# Patient Record
Sex: MALE | Race: WHITE | NOT HISPANIC OR LATINO | Employment: OTHER | URBAN - METROPOLITAN AREA
[De-identification: names, ages, dates, MRNs, and addresses within clinical notes are randomized per-mention and may not be internally consistent; named-entity substitution may affect disease eponyms.]

---

## 2023-02-24 PROBLEM — E78.5 DYSLIPIDEMIA: Status: ACTIVE | Noted: 2023-02-24

## 2023-02-24 PROBLEM — I10 HYPERTENSION: Status: ACTIVE | Noted: 2023-02-24

## 2023-02-24 PROBLEM — K22.70 BARRETT ESOPHAGUS: Status: ACTIVE | Noted: 2023-02-24

## 2023-02-24 PROBLEM — Z99.89 CPAP (CONTINUOUS POSITIVE AIRWAY PRESSURE) DEPENDENCE: Status: ACTIVE | Noted: 2023-02-24

## 2023-02-24 PROBLEM — G47.33 OBSTRUCTIVE SLEEP APNEA: Status: ACTIVE | Noted: 2023-02-24

## 2023-02-24 RX ORDER — LOSARTAN POTASSIUM AND HYDROCHLOROTHIAZIDE 12.5; 5 MG/1; MG/1
1 TABLET ORAL DAILY
COMMUNITY
Start: 2021-12-21 | End: 2024-01-16

## 2023-02-24 RX ORDER — PSYLLIUM HUSK 0.4 G
1 CAPSULE ORAL
COMMUNITY
Start: 2021-01-19

## 2023-02-24 RX ORDER — ESOMEPRAZOLE MAGNESIUM 40 MG/1
1 CAPSULE, DELAYED RELEASE ORAL DAILY
COMMUNITY
Start: 2012-11-21 | End: 2023-03-09 | Stop reason: SDUPTHER

## 2023-02-24 RX ORDER — ROSUVASTATIN CALCIUM 20 MG/1
1 TABLET, COATED ORAL DAILY
COMMUNITY
Start: 2022-01-21 | End: 2023-05-25 | Stop reason: ALTCHOICE

## 2023-03-09 DIAGNOSIS — K22.719 BARRETT'S ESOPHAGUS WITH DYSPLASIA: Primary | ICD-10-CM

## 2023-03-09 RX ORDER — ESOMEPRAZOLE MAGNESIUM 40 MG/1
40 CAPSULE, DELAYED RELEASE ORAL DAILY
Qty: 90 CAPSULE | Refills: 0 | Status: SHIPPED | OUTPATIENT
Start: 2023-03-09 | End: 2023-05-30

## 2023-03-09 RX ORDER — ROSUVASTATIN CALCIUM 10 MG/1
10 TABLET, COATED ORAL NIGHTLY
COMMUNITY
Start: 2022-08-29 | End: 2023-05-25 | Stop reason: ALTCHOICE

## 2023-03-09 RX ORDER — ROSUVASTATIN CALCIUM 40 MG/1
40 TABLET, COATED ORAL DAILY
COMMUNITY
Start: 2023-01-20 | End: 2024-03-19

## 2023-03-22 ENCOUNTER — TELEPHONE (OUTPATIENT)
Dept: PRIMARY CARE | Facility: CLINIC | Age: 58
End: 2023-03-22
Payer: COMMERCIAL

## 2023-03-22 DIAGNOSIS — J45.20 MILD INTERMITTENT REACTIVE AIRWAY DISEASE WITHOUT COMPLICATION (HHS-HCC): Primary | ICD-10-CM

## 2023-03-22 RX ORDER — ALBUTEROL SULFATE 90 UG/1
2 AEROSOL, METERED RESPIRATORY (INHALATION) EVERY 6 HOURS PRN
COMMUNITY
End: 2023-03-23 | Stop reason: SDUPTHER

## 2023-03-24 DIAGNOSIS — J45.20 MILD INTERMITTENT REACTIVE AIRWAY DISEASE WITHOUT COMPLICATION (HHS-HCC): ICD-10-CM

## 2023-03-24 RX ORDER — ALBUTEROL SULFATE 90 UG/1
2 AEROSOL, METERED RESPIRATORY (INHALATION) EVERY 6 HOURS PRN
Qty: 18 G | Refills: 3 | Status: SHIPPED | OUTPATIENT
Start: 2023-03-24 | End: 2023-03-24 | Stop reason: SDUPTHER

## 2023-03-24 RX ORDER — ALBUTEROL SULFATE 90 UG/1
2 AEROSOL, METERED RESPIRATORY (INHALATION) EVERY 6 HOURS PRN
Qty: 18 G | Refills: 3 | Status: SHIPPED | OUTPATIENT
Start: 2023-03-24

## 2023-04-13 LAB
ALANINE AMINOTRANSFERASE (SGPT) (U/L) IN SER/PLAS: 40 U/L (ref 10–52)
ALBUMIN (G/DL) IN SER/PLAS: 4.1 G/DL (ref 3.4–5)
ALKALINE PHOSPHATASE (U/L) IN SER/PLAS: 87 U/L (ref 33–120)
ASPARTATE AMINOTRANSFERASE (SGOT) (U/L) IN SER/PLAS: 23 U/L (ref 9–39)
BILIRUBIN DIRECT (MG/DL) IN SER/PLAS: 0.1 MG/DL (ref 0–0.3)
BILIRUBIN TOTAL (MG/DL) IN SER/PLAS: 0.8 MG/DL (ref 0–1.2)
CHOLESTEROL (MG/DL) IN SER/PLAS: 166 MG/DL (ref 0–199)
CHOLESTEROL IN HDL (MG/DL) IN SER/PLAS: 47.2 MG/DL
CHOLESTEROL/HDL RATIO: 3.5
ESTIMATED AVERAGE GLUCOSE FOR HBA1C: 123 MG/DL
HEMOGLOBIN A1C/HEMOGLOBIN TOTAL IN BLOOD: 5.9 %
LDL: 102 MG/DL (ref 0–99)
PROTEIN TOTAL: 7.2 G/DL (ref 6.4–8.2)
TRIGLYCERIDE (MG/DL) IN SER/PLAS: 83 MG/DL (ref 0–149)
VLDL: 17 MG/DL (ref 0–40)

## 2023-04-17 ENCOUNTER — APPOINTMENT (OUTPATIENT)
Dept: PRIMARY CARE | Facility: CLINIC | Age: 58
End: 2023-04-17
Payer: COMMERCIAL

## 2023-05-22 PROBLEM — K64.9 HEMORRHOID: Status: ACTIVE | Noted: 2023-05-22

## 2023-05-22 PROBLEM — E66.9 OBESITY (BMI 30-39.9): Status: RESOLVED | Noted: 2023-05-22 | Resolved: 2023-05-22

## 2023-05-22 PROBLEM — R03.0 ELEVATED BLOOD PRESSURE READING: Status: ACTIVE | Noted: 2023-05-22

## 2023-05-22 PROBLEM — R09.81 NASAL CONGESTION: Status: RESOLVED | Noted: 2023-05-22 | Resolved: 2023-05-22

## 2023-05-22 PROBLEM — R73.9 ELEVATED BLOOD SUGAR: Status: ACTIVE | Noted: 2023-05-22

## 2023-05-22 PROBLEM — B00.1 HERPES LABIALIS: Status: ACTIVE | Noted: 2023-05-22

## 2023-05-22 RX ORDER — EPINEPHRINE 0.1 MG/ML
INJECTION INTRAVENOUS
COMMUNITY

## 2023-05-22 RX ORDER — SODIUM, POTASSIUM,MAG SULFATES 17.5-3.13G
SOLUTION, RECONSTITUTED, ORAL ORAL
COMMUNITY
Start: 2023-04-18

## 2023-05-25 ENCOUNTER — OFFICE VISIT (OUTPATIENT)
Dept: PRIMARY CARE | Facility: CLINIC | Age: 58
End: 2023-05-25
Payer: COMMERCIAL

## 2023-05-25 ENCOUNTER — TELEPHONE (OUTPATIENT)
Dept: PRIMARY CARE | Facility: CLINIC | Age: 58
End: 2023-05-25

## 2023-05-25 VITALS
WEIGHT: 257 LBS | OXYGEN SATURATION: 97 % | SYSTOLIC BLOOD PRESSURE: 150 MMHG | BODY MASS INDEX: 34.81 KG/M2 | TEMPERATURE: 97.4 F | HEART RATE: 67 BPM | DIASTOLIC BLOOD PRESSURE: 80 MMHG | HEIGHT: 72 IN

## 2023-05-25 DIAGNOSIS — I86.1 VARICOCELE: ICD-10-CM

## 2023-05-25 DIAGNOSIS — E78.5 DYSLIPIDEMIA: ICD-10-CM

## 2023-05-25 DIAGNOSIS — N50.819 TESTICULAR DISCOMFORT: ICD-10-CM

## 2023-05-25 DIAGNOSIS — I10 BENIGN ESSENTIAL HYPERTENSION: Primary | ICD-10-CM

## 2023-05-25 DIAGNOSIS — I10 PRIMARY HYPERTENSION: ICD-10-CM

## 2023-05-25 DIAGNOSIS — R73.03 PRE-DIABETES: ICD-10-CM

## 2023-05-25 PROBLEM — M25.642 STIFFNESS OF FINGER JOINT OF LEFT HAND: Status: ACTIVE | Noted: 2019-01-28

## 2023-05-25 PROBLEM — G56.00 CARPAL TUNNEL SYNDROME: Status: ACTIVE | Noted: 2018-10-31

## 2023-05-25 PROBLEM — K64.9 HEMORRHOID: Status: RESOLVED | Noted: 2023-05-22 | Resolved: 2023-05-25

## 2023-05-25 PROBLEM — R03.0 ELEVATED BLOOD PRESSURE READING: Status: RESOLVED | Noted: 2023-05-22 | Resolved: 2023-05-25

## 2023-05-25 PROBLEM — G56.00 CARPAL TUNNEL SYNDROME: Status: RESOLVED | Noted: 2018-10-31 | Resolved: 2023-05-25

## 2023-05-25 PROBLEM — J45.909 ASTHMA WITHOUT STATUS ASTHMATICUS (HHS-HCC): Status: ACTIVE | Noted: 2023-05-25

## 2023-05-25 PROBLEM — G56.03 BILATERAL CARPAL TUNNEL SYNDROME: Status: ACTIVE | Noted: 2018-10-29

## 2023-05-25 PROBLEM — R73.9 ELEVATED BLOOD SUGAR: Status: RESOLVED | Noted: 2023-05-22 | Resolved: 2023-05-25

## 2023-05-25 PROCEDURE — 99214 OFFICE O/P EST MOD 30 MIN: CPT | Performed by: FAMILY MEDICINE

## 2023-05-25 PROCEDURE — 3077F SYST BP >= 140 MM HG: CPT | Performed by: FAMILY MEDICINE

## 2023-05-25 PROCEDURE — 1036F TOBACCO NON-USER: CPT | Performed by: FAMILY MEDICINE

## 2023-05-25 PROCEDURE — 3079F DIAST BP 80-89 MM HG: CPT | Performed by: FAMILY MEDICINE

## 2023-05-25 RX ORDER — METOPROLOL SUCCINATE 50 MG/1
TABLET, EXTENDED RELEASE ORAL
COMMUNITY
Start: 2010-06-22

## 2023-05-25 RX ORDER — AMLODIPINE BESYLATE 5 MG/1
5 TABLET ORAL
COMMUNITY
Start: 2018-06-27 | End: 2023-05-25 | Stop reason: ALTCHOICE

## 2023-05-25 RX ORDER — DOCUSATE SODIUM 100 MG/1
1 CAPSULE, LIQUID FILLED ORAL 2 TIMES DAILY PRN
COMMUNITY
Start: 2019-01-15 | End: 2023-05-25 | Stop reason: ALTCHOICE

## 2023-05-25 RX ORDER — AMLODIPINE BESYLATE 10 MG/1
TABLET ORAL
COMMUNITY
Start: 2017-03-07 | End: 2023-05-25 | Stop reason: ALTCHOICE

## 2023-05-25 RX ORDER — VALACYCLOVIR HYDROCHLORIDE 500 MG/1
500 TABLET, FILM COATED ORAL
COMMUNITY
Start: 2017-03-06 | End: 2024-03-12 | Stop reason: ALTCHOICE

## 2023-05-25 RX ORDER — SALICYLIC ACID 260 MG/ML
LIQUID TOPICAL
COMMUNITY
Start: 2010-06-22 | End: 2023-05-25 | Stop reason: ALTCHOICE

## 2023-05-25 ASSESSMENT — ENCOUNTER SYMPTOMS
ABDOMINAL PAIN: 0
DIZZINESS: 0
FATIGUE: 0
HALLUCINATIONS: 0
DYSURIA: 0
BLOOD IN STOOL: 0
JOINT SWELLING: 0
TROUBLE SWALLOWING: 0
SHORTNESS OF BREATH: 0
NAUSEA: 0
CONFUSION: 0
UNEXPECTED WEIGHT CHANGE: 0
HEADACHES: 0
PALPITATIONS: 0
DECREASED CONCENTRATION: 0
WEAKNESS: 0
FREQUENCY: 0
EYE PAIN: 0
SORE THROAT: 0
HEMATURIA: 0
NUMBNESS: 0
VOMITING: 0
FEVER: 0
DIARRHEA: 0
COUGH: 0

## 2023-05-25 ASSESSMENT — PATIENT HEALTH QUESTIONNAIRE - PHQ9
1. LITTLE INTEREST OR PLEASURE IN DOING THINGS: NOT AT ALL
2. FEELING DOWN, DEPRESSED OR HOPELESS: NOT AT ALL
SUM OF ALL RESPONSES TO PHQ9 QUESTIONS 1 AND 2: 0

## 2023-05-25 ASSESSMENT — PAIN SCALES - GENERAL: PAINLEVEL: 0-NO PAIN

## 2023-05-25 NOTE — TELEPHONE ENCOUNTER
Pharmacist questioning ozempic written at .25mg for 14 days, then .5mg for 14 days, then 1mg there after. States typically written for a month, then increased. If wanting patient taking new dose every 14 days, requesting 3 separate scripts be sent to pharmacy with individual instructions.

## 2023-05-25 NOTE — PATIENT INSTRUCTIONS
It was nice to see you today!  Discussed current concerns and addressed   Reviewed recent labs and diagnostics  Reviewed medications list  Continue to eat a healthy diet, exercise at least 3 times a week or more  Plan and follow up discussed  For any further information related to your condition, copy and paste or go to [email or web address]  Start ozempic for sugar  Check ultrasound of L scrotum

## 2023-05-25 NOTE — PROGRESS NOTES
Subjective   Patient ID: Woodrow Rodriguez is a 58 y.o. male.    Patient comes in today for follow-up.  He is overweight with a BMI of 34.  He had labs done last month.  Glycohemoglobin 5.9%.  LDL is good at 102.  Hepatic function is normal.  He is very physically active but he loves to eat.  He reports eating 5 to 10,000 ulisses daily he also has an enlargement in the left scrotal sac.  It is somewhat painful.  There may have been a trauma decades ago.  No dysuria and no gross hematuria.  Blood pressure is elevated.  He is on medication for high blood pressure, high cholesterol.  We discussed starting semaglutide for his weight and his blood sugar.  He does not smoke.  He is a retired .        Review of Systems   Constitutional:  Negative for fatigue, fever and unexpected weight change.   HENT:  Negative for congestion, ear pain, hearing loss, sore throat and trouble swallowing.    Eyes:  Negative for pain and visual disturbance.   Respiratory:  Negative for cough and shortness of breath.    Cardiovascular:  Negative for chest pain, palpitations and leg swelling.   Gastrointestinal:  Negative for abdominal pain, blood in stool, diarrhea, nausea and vomiting.   Genitourinary:  Positive for scrotal swelling. Negative for dysuria, frequency, hematuria and urgency.   Musculoskeletal:  Negative for joint swelling.   Skin:  Negative for pallor and rash.   Neurological:  Negative for dizziness, syncope, weakness, numbness and headaches.   Psychiatric/Behavioral:  Negative for confusion, decreased concentration, hallucinations and suicidal ideas.      Vitals:    05/25/23 0741   BP: 150/80   Pulse: 67   Temp: 36.3 °C (97.4 °F)   SpO2: 97%      Objective   Physical Exam  Constitutional:       Appearance: Normal appearance.   Cardiovascular:      Rate and Rhythm: Normal rate and regular rhythm.      Heart sounds: Normal heart sounds.   Pulmonary:      Effort: Pulmonary effort is normal.      Breath sounds:  Normal breath sounds.   Genitourinary:     Comments: Left scrotal sac with palpable testis that is normal.  He does have soft contents of the sac above the testicle.  It is nontender, it is not a well circumscribed mass.  Musculoskeletal:      Cervical back: Neck supple.   Skin:     General: Skin is warm and dry.   Neurological:      General: No focal deficit present.      Mental Status: He is alert.   Psychiatric:         Mood and Affect: Mood normal.         Speech: Speech normal.         Behavior: Behavior normal.         Cognition and Memory: Cognition normal.         Assessment/Plan   Diagnoses and all orders for this visit:  Benign essential hypertension  Primary hypertension  Pre-diabetes  -     semaglutide 0.25 mg or 0.5 mg (2 mg/3 mL) pen injector; Inject 0.25 mg under the skin 1 (one) time per week for 14 days, THEN 0.5 mg 1 (one) time per week for 14 days, THEN 1 mg 1 (one) time per week.  Testicular discomfort  -     US scrotum; Future

## 2023-05-26 DIAGNOSIS — K22.719 BARRETT'S ESOPHAGUS WITH DYSPLASIA: ICD-10-CM

## 2023-05-30 RX ORDER — ESOMEPRAZOLE MAGNESIUM 40 MG/1
CAPSULE, DELAYED RELEASE ORAL
Qty: 90 CAPSULE | Refills: 3 | Status: SHIPPED | OUTPATIENT
Start: 2023-05-30

## 2023-05-30 RX ORDER — SEMAGLUTIDE 1.34 MG/ML
1 INJECTION, SOLUTION SUBCUTANEOUS
COMMUNITY
End: 2023-08-31 | Stop reason: ALTCHOICE

## 2023-06-09 ENCOUNTER — TELEPHONE (OUTPATIENT)
Dept: PRIMARY CARE | Facility: CLINIC | Age: 58
End: 2023-06-09
Payer: COMMERCIAL

## 2023-06-09 DIAGNOSIS — R73.03 PRE-DIABETES: Primary | ICD-10-CM

## 2023-06-09 RX ORDER — METFORMIN HYDROCHLORIDE 500 MG/1
1000 TABLET, EXTENDED RELEASE ORAL
Qty: 60 TABLET | Refills: 11 | Status: SHIPPED | OUTPATIENT
Start: 2023-06-09 | End: 2024-06-08

## 2023-06-09 NOTE — TELEPHONE ENCOUNTER
Ozempic not covered. Per MD, will send metfromin in place of Ozempic. Patient notified. He mentioned asking the pharmacist the cost of the Ozempic out of pocket as well.

## 2023-08-28 PROBLEM — I86.1 VARICOCELE: Status: ACTIVE | Noted: 2023-08-28

## 2023-08-28 PROBLEM — N50.819 PERSISTENT TESTICULAR PAIN: Status: ACTIVE | Noted: 2023-08-28

## 2023-08-31 ENCOUNTER — OFFICE VISIT (OUTPATIENT)
Dept: PRIMARY CARE | Facility: CLINIC | Age: 58
End: 2023-08-31
Payer: COMMERCIAL

## 2023-08-31 VITALS
SYSTOLIC BLOOD PRESSURE: 110 MMHG | WEIGHT: 255 LBS | BODY MASS INDEX: 34.54 KG/M2 | OXYGEN SATURATION: 98 % | DIASTOLIC BLOOD PRESSURE: 70 MMHG | HEIGHT: 72 IN | HEART RATE: 78 BPM | TEMPERATURE: 97.1 F

## 2023-08-31 DIAGNOSIS — G47.33 OBSTRUCTIVE SLEEP APNEA: ICD-10-CM

## 2023-08-31 DIAGNOSIS — R73.03 PRE-DIABETES: ICD-10-CM

## 2023-08-31 DIAGNOSIS — I10 BENIGN ESSENTIAL HYPERTENSION: Primary | ICD-10-CM

## 2023-08-31 DIAGNOSIS — Z99.89 CPAP (CONTINUOUS POSITIVE AIRWAY PRESSURE) DEPENDENCE: ICD-10-CM

## 2023-08-31 DIAGNOSIS — E78.5 DYSLIPIDEMIA: ICD-10-CM

## 2023-08-31 LAB — POC HEMOGLOBIN A1C: 5.9 % (ref 4.2–6.5)

## 2023-08-31 PROCEDURE — 3074F SYST BP LT 130 MM HG: CPT | Performed by: FAMILY MEDICINE

## 2023-08-31 PROCEDURE — 99213 OFFICE O/P EST LOW 20 MIN: CPT | Performed by: FAMILY MEDICINE

## 2023-08-31 PROCEDURE — 1036F TOBACCO NON-USER: CPT | Performed by: FAMILY MEDICINE

## 2023-08-31 PROCEDURE — 83036 HEMOGLOBIN GLYCOSYLATED A1C: CPT | Performed by: FAMILY MEDICINE

## 2023-08-31 PROCEDURE — 3078F DIAST BP <80 MM HG: CPT | Performed by: FAMILY MEDICINE

## 2023-08-31 RX ORDER — AMLODIPINE BESYLATE 5 MG/1
5 TABLET ORAL
COMMUNITY
Start: 2018-06-27

## 2023-08-31 ASSESSMENT — ENCOUNTER SYMPTOMS
FATIGUE: 0
NUMBNESS: 0
UNEXPECTED WEIGHT CHANGE: 0
SHORTNESS OF BREATH: 0
TROUBLE SWALLOWING: 0
DIZZINESS: 0
NAUSEA: 0
HEADACHES: 0
CONFUSION: 0
FEVER: 0
HEMATURIA: 0
VOMITING: 0
ABDOMINAL PAIN: 0
JOINT SWELLING: 0
DYSURIA: 0
EYE PAIN: 0
HALLUCINATIONS: 0
PALPITATIONS: 0
BLOOD IN STOOL: 0
DIARRHEA: 0
FREQUENCY: 0
COUGH: 0
WEAKNESS: 0
SORE THROAT: 0
DECREASED CONCENTRATION: 0

## 2023-08-31 ASSESSMENT — PAIN SCALES - GENERAL: PAINLEVEL: 0-NO PAIN

## 2023-08-31 NOTE — PROGRESS NOTES
Subjective   Patient ID: Woodrow Rodriguez is a 58 y.o. male.    Patient comes in today for follow-up and recheck on A1c.  He is prediabetic and I have put him on metformin.  He did not take it because he read the side effects.  His BMI is 34.58.  He is trying to work on diet and exercise but has not been successful.  He has no chest pain or shortness of breath.  He has sleep at [facility] on CPAP.  He has hypertension and dyslipidemia.  He does not smoke.        Review of Systems   Constitutional:  Negative for fatigue, fever and unexpected weight change.   HENT:  Negative for congestion, ear pain, hearing loss, sore throat and trouble swallowing.    Eyes:  Negative for pain and visual disturbance.   Respiratory:  Negative for cough and shortness of breath.    Cardiovascular:  Negative for chest pain, palpitations and leg swelling.   Gastrointestinal:  Negative for abdominal pain, blood in stool, diarrhea, nausea and vomiting.   Genitourinary:  Negative for dysuria, frequency, hematuria and urgency.   Musculoskeletal:  Negative for joint swelling.   Skin:  Negative for pallor and rash.   Neurological:  Negative for dizziness, syncope, weakness, numbness and headaches.   Psychiatric/Behavioral:  Negative for confusion, decreased concentration, hallucinations and suicidal ideas.      Vitals:    08/31/23 0749   BP: 110/70   Pulse: 78   Temp: 36.2 °C (97.1 °F)   SpO2: 98%      Objective   Physical Exam  Constitutional:       Appearance: Normal appearance. He is obese.   Cardiovascular:      Rate and Rhythm: Normal rate and regular rhythm.      Heart sounds: Normal heart sounds.   Pulmonary:      Effort: Pulmonary effort is normal.      Breath sounds: Normal breath sounds.   Musculoskeletal:      Cervical back: Neck supple.   Skin:     General: Skin is warm and dry.   Neurological:      General: No focal deficit present.      Mental Status: He is alert.   Psychiatric:         Mood and Affect: Mood normal.         Speech:  Speech normal.         Behavior: Behavior normal.         Cognition and Memory: Cognition normal.     Please take the metformin.  We will recheck in 3 months, work on cutting calories down    Assessment/Plan   Diagnoses and all orders for this visit:  Benign essential hypertension  Pre-diabetes  -     POCT glycosylated hemoglobin (Hb A1C) manually resulted  Dyslipidemia  Obstructive sleep apnea  CPAP (continuous positive airway pressure) dependence

## 2023-09-18 ENCOUNTER — HOSPITAL ENCOUNTER (OUTPATIENT)
Dept: DATA CONVERSION | Facility: HOSPITAL | Age: 58
End: 2023-09-18
Attending: INTERNAL MEDICINE | Admitting: INTERNAL MEDICINE
Payer: COMMERCIAL

## 2023-09-18 DIAGNOSIS — Z09 ENCOUNTER FOR FOLLOW-UP EXAMINATION AFTER COMPLETED TREATMENT FOR CONDITIONS OTHER THAN MALIGNANT NEOPLASM: ICD-10-CM

## 2023-09-18 DIAGNOSIS — K31.A11 GASTRIC INTESTINAL METAPLASIA WITHOUT DYSPLASIA, INVOLVING THE ANTRUM: ICD-10-CM

## 2023-09-18 DIAGNOSIS — K22.70 BARRETT'S ESOPHAGUS WITHOUT DYSPLASIA: ICD-10-CM

## 2023-09-18 DIAGNOSIS — Z86.010 PERSONAL HISTORY OF COLONIC POLYPS: ICD-10-CM

## 2023-09-18 DIAGNOSIS — Z12.11 ENCOUNTER FOR SCREENING FOR MALIGNANT NEOPLASM OF COLON: ICD-10-CM

## 2023-09-18 LAB — POCT GLUCOSE: 96 MG/DL (ref 74–99)

## 2023-09-18 RX ORDER — VALACYCLOVIR HYDROCHLORIDE 1 G/1
2 TABLET, FILM COATED ORAL
COMMUNITY
End: 2024-03-12 | Stop reason: SDUPTHER

## 2023-09-18 RX ORDER — FLUTICASONE PROPIONATE 50 MCG
2 SPRAY, SUSPENSION (ML) NASAL DAILY
COMMUNITY

## 2023-09-25 LAB
COMPLETE PATHOLOGY REPORT: NORMAL
CONVERTED CLINICAL DIAGNOSIS-HISTORY: NORMAL
CONVERTED FINAL DIAGNOSIS: NORMAL
CONVERTED FINAL REPORT PDF LINK TO COPY AND PASTE: NORMAL
CONVERTED GROSS DESCRIPTION: NORMAL

## 2023-09-26 LAB
ALANINE AMINOTRANSFERASE (SGPT) (U/L) IN SER/PLAS: 24 U/L (ref 10–52)
ALBUMIN (G/DL) IN SER/PLAS: 4.3 G/DL (ref 3.4–5)
ALKALINE PHOSPHATASE (U/L) IN SER/PLAS: 82 U/L (ref 33–120)
ANION GAP IN SER/PLAS: 11 MMOL/L (ref 10–20)
ASPARTATE AMINOTRANSFERASE (SGOT) (U/L) IN SER/PLAS: 19 U/L (ref 9–39)
BASOPHILS (10*3/UL) IN BLOOD BY AUTOMATED COUNT: 0.03 X10E9/L (ref 0–0.1)
BASOPHILS/100 LEUKOCYTES IN BLOOD BY AUTOMATED COUNT: 0.6 % (ref 0–2)
BILIRUBIN TOTAL (MG/DL) IN SER/PLAS: 0.9 MG/DL (ref 0–1.2)
CALCIUM (MG/DL) IN SER/PLAS: 9.2 MG/DL (ref 8.6–10.3)
CARBON DIOXIDE, TOTAL (MMOL/L) IN SER/PLAS: 29 MMOL/L (ref 21–32)
CHLORIDE (MMOL/L) IN SER/PLAS: 101 MMOL/L (ref 98–107)
CREATININE (MG/DL) IN SER/PLAS: 0.91 MG/DL (ref 0.5–1.3)
EOSINOPHILS (10*3/UL) IN BLOOD BY AUTOMATED COUNT: 0.13 X10E9/L (ref 0–0.7)
EOSINOPHILS/100 LEUKOCYTES IN BLOOD BY AUTOMATED COUNT: 2.5 % (ref 0–6)
ERYTHROCYTE DISTRIBUTION WIDTH (RATIO) BY AUTOMATED COUNT: 13 % (ref 11.5–14.5)
ERYTHROCYTE MEAN CORPUSCULAR HEMOGLOBIN CONCENTRATION (G/DL) BY AUTOMATED: 33.4 G/DL (ref 32–36)
ERYTHROCYTE MEAN CORPUSCULAR VOLUME (FL) BY AUTOMATED COUNT: 87 FL (ref 80–100)
ERYTHROCYTES (10*6/UL) IN BLOOD BY AUTOMATED COUNT: 4.98 X10E12/L (ref 4.5–5.9)
ESTIMATED AVERAGE GLUCOSE FOR HBA1C: 120 MG/DL
GFR MALE: >90 ML/MIN/1.73M2
GLUCOSE (MG/DL) IN SER/PLAS: 90 MG/DL (ref 74–99)
HEMATOCRIT (%) IN BLOOD BY AUTOMATED COUNT: 43.4 % (ref 41–52)
HEMOGLOBIN (G/DL) IN BLOOD: 14.5 G/DL (ref 13.5–17.5)
HEMOGLOBIN A1C/HEMOGLOBIN TOTAL IN BLOOD: 5.8 %
IMMATURE GRANULOCYTES/100 LEUKOCYTES IN BLOOD BY AUTOMATED COUNT: 0.2 % (ref 0–0.9)
LEUKOCYTES (10*3/UL) IN BLOOD BY AUTOMATED COUNT: 5.3 X10E9/L (ref 4.4–11.3)
LYMPHOCYTES (10*3/UL) IN BLOOD BY AUTOMATED COUNT: 1.59 X10E9/L (ref 1.2–4.8)
LYMPHOCYTES/100 LEUKOCYTES IN BLOOD BY AUTOMATED COUNT: 30.1 % (ref 13–44)
MONOCYTES (10*3/UL) IN BLOOD BY AUTOMATED COUNT: 0.54 X10E9/L (ref 0.1–1)
MONOCYTES/100 LEUKOCYTES IN BLOOD BY AUTOMATED COUNT: 10.2 % (ref 2–10)
NEUTROPHILS (10*3/UL) IN BLOOD BY AUTOMATED COUNT: 2.99 X10E9/L (ref 1.2–7.7)
NEUTROPHILS/100 LEUKOCYTES IN BLOOD BY AUTOMATED COUNT: 56.4 % (ref 40–80)
PLATELETS (10*3/UL) IN BLOOD AUTOMATED COUNT: 300 X10E9/L (ref 150–450)
POTASSIUM (MMOL/L) IN SER/PLAS: 3.7 MMOL/L (ref 3.5–5.3)
PROTEIN TOTAL: 7 G/DL (ref 6.4–8.2)
SODIUM (MMOL/L) IN SER/PLAS: 137 MMOL/L (ref 136–145)
UREA NITROGEN (MG/DL) IN SER/PLAS: 15 MG/DL (ref 6–23)

## 2023-09-30 NOTE — H&P (VIEW-ONLY)
History of Present Illness:   HPI:    SURGEON :DR SNYDER   PCP:  Fatoumata Renee MD  Surgery, Date, and Length:  Left varicocelectomy, spermatic cord denervation 10/05/2023, 1.5 hours     HPI:  This a 58 y.o. male who presents for presurgical evaluation for  Left varicocelectomy, spermatic cord denervation  . The patient  has dull achy scrotal discomfort. He reports previous testictular injury. He says standing and walking makes discomfort worse.After discussion of the risks and benefits with   the patient elects to proceed with the planned procedure.      Medical History:  HTN   Dyslipidemia  Elevated CT cardiac score total 96.9. 2/2022  -- FINDINGS: LM 44.7, distal vessel LAD 7.9, proximal to mid vessel LCx 0, RCA 44.3, proximal vessel Total  96.9  Asthma    --exercise induced   --controlled  NEAL on CPAP nightly   Reactive airway disease, mild intermittent, uncomplicated  T2DM  --1/17/2023: A1C 5.9  -- diet and exercise  GERD   Waterman esophagus  --STABLE   --EGD 9/2023  DM2  --metformin   Cervical spondylosis  Bilateral CTS s/p repair   BPH  Scrotal varices   Obesity, BMI 36     Surgical History:  Colonoscopy -9/2023   Endoscopy, upper GI -9/2023    CTS, bilateral -2019  C5-6 FUSION  surgery -2013   Vasectomy -2005     Anesthesia History  Pt denies any past history of anesthetic complications such as PONV, awareness, prolonged sedation, dental damage, aspiration, cardiac arrest, difficult intubation, difficult I.V. access or unexpected hospital admissions.  NO malignant hyperthermia and or pseudo cholinesterase deficiency.    The patient is not a Jehovah?s Witness and will  accept blood and blood products if medically indicated.   No history of blood transfusions .Type and screen not sent.     Body Measurements    T   P  R  BP   MAP  SpO2   Value  35.3  74  18  136/82      96%  Date/Time 9/26 7:37 9/26 7:37 9/26 7:37 9/26 7:37    9/26 7:37  Range  (35.3C - 35.3C )  (74 - 74 )  (18 -  18 )  (136 - 136 )/ (82 - 82 )    (96% - 96% )         Weights   9/26 7:37: Weight in kg (Weight (kg))  117  9/26 7:37: Weight in lbs ((lbs))  257.9  9/26 7:37: BMI (kg/m2) (BMI (kg/m2))  34.038    Comorbidities:   Comorbidites:  ·  Comorbid Conditions diabetes, hypertension   ·  Diabetes Type Type 2   ·  Insulin Dependent no   ·  DM Acuity or Status unknown   ·  DM Complications unknown     Family History:   Family History: reviewed and not pertinent to presenting  problem     Social History:   Social History:  Smoking Status never smoker   Alcohol Use occasionally, 2-3 DRINKS WEEK   Drug Use denies              Allergies:  ·  NKDA :   ·  Animal/Fur  Dander: Other    Medications Prior to Admission:   7 DAYS PRIOR TO SURGERY, on 9/28/23, STOP these medications:   Motrin, Ibuprofen ,Advil, Aleve, Naproxen, Naprosyn      DAY OF SURGERY, CONTINUE these medications:  rosuvastatin 40 mg oral tablet: 1 tab(s) orally once a day  NexIUM 40 mg oral delayed release capsule: 1 cap(s) orally once a day  Ventolin HFA 90 mcg/inh inhalation aerosol: 2 puff(s) inhaled every 6 hours, As Needed    DAY OF SURGERY, HOLD these medications:.  losartan-hydrochlorothiazide 50 mg-12.5 mg oral tablet: 1 tab(s) orally once a day  metFORMIN 500 mg oral tablet: 2 tab(s) orally once a day  Metamucil 3.4 g/3.7 g oral powder for reconstitution: 1 dose(s) orally once a day, As Needed  Vitamin C 500 mg oral tablet: 1 tab(s) orally once a day, As Needed.    Review of Systems:   Musculoskeletal: POSITIVE: Decreased ROM;  COMMENTS: NECK     Breast: COMMENTS: NA       Objective:   Physical Exam by System:    Constitutional: Well developed, awake/alert/oriented  x3, no distress, alert and cooperative   Eyes: PERRL, EOMI, clear sclera   ENMT: mucous membranes moist, no apparent injury,  no lesions seen   Head/Neck: Neck supple, no apparent injury, thyroid  without mass or tenderness, No JVD, trachea midline, no bruits   Respiratory/Thorax: Patent airways,  CTAB, normal  breath sounds with good chest expansion, thorax symmetric   Cardiovascular: Regular, rate and rhythm, no murmurs,  2+ equal pulses of the extremities, normal S 1and S 2   Gastrointestinal: Nondistended, soft, non-tender,  no rebound tenderness or guarding,   Musculoskeletal: ROM intact, no joint swelling, normal  strength  NECK EXTENSION DECREASED , ROTATION LIMITED   Extremities: normal extremities, no cyanosis edema,  contusions or wounds, no clubbing   Neurological: alert and oriented x3, intact senses,  motor, response and reflexes, normal strength   Psychological: Appropriate mood and behavior   Skin: Warm and dry, no lesions, no rashes     Airway Classification:  ·  Oropharyngeal Classification Class III   ·  Airway Image Comments TEETH INTACT, CROWNS     Recent Lab Results:    Results:        I have reviewed these laboratory results:    Hemoglobin A1C  26-Sep-2023 08:12:00      Result Value    Hemoglobin A1C, Level  5.8 Diagnosis of Diabetes-Adults Non-Diabetic: < or = 5.6% Increased risk for developing diabetes: 5.7-6.4% Diagnostic of diabetes: > or = 6.5%.     Monitoring  of Diabetes              Age (y)     Therapeutic Goal (%) Adults:          >1   A   Estimated Average Glucose  120      Complete Blood Count + Differential  26-Sep-2023 08:12:00      Result Value    White Blood Cell Count  5.3    Red Blood Cell Count  4.98    HGB  14.5    HCT  43.4    MCV  87    MCHC  33.4    PLT  300    RDW-CV  13.0    Neutrophil %  56.4    Immature Granulocytes %  0.2    Lymphocyte %  30.1    Monocyte %  10.2    Eosinophil %  2.5    Basophil %  0.6    Neutrophil Count  2.99    Lymphocyte Count  1.59    Monocyte Count  0.54    Eosinophil Count  0.13    Basophil Count  0.03      Comprehensive Metabolic Panel  26-Sep-2023 08:12:00      Result Value    Glucose, Serum  90    NA  137    K  3.7    CL  101    Bicarbonate, Serum  29    Anion Gap, Serum  11    BUN  15    CREAT  0.91    GFR Male  >90    Calcium, Serum   9.2    ALB  4.3    ALKP  82    T Pro  7.0    T Bili  0.9    Alanine Aminotransferase, Serum  24    Aspartate Transaminase, Serum  19        Radiology Results:    Results:        Impression:    1. No MR evidence of clinically significant prostatic neoplasm.  2. BPH changes of the transition zone. Diffuse non nodular  hypointensities within the peripheral zone, without evidence of  focally restricted diffusion ( PI-RADS 2).     PI-RADS 2 - Low (clinically significant cancer is unlikely to be  present)      MRI Prostate Screening (Self Pay exam) [Jul 28 2023 10:32AM]      Impression:    No evidence of testicular torsion or epididymo-orchitis.     Left-sided varicocele.     Ultrasound Scrotum [Jun 7 2023  7:09PM]      Assessment and Plan:   Assessment:      ASSESSMENT/PLAN    Patient is a 58 year-old  scheduled for Left varicocelectomy, spermatic cord denervation with Dr. PETERSON   on  10/5/23  .  CARDIOVASCULAR:  RCRI score / Risk: The patients score is 0  based on history . Per ACC/AHA guidelines this places him at  3.9% risk for MACE undergoing a LOW   risk procedure . The patient has  the following risk factors: DENIES   Functional Capacity: The patients exercise tolerance is  4+  METS. 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 Patient denies  active cardiac symptoms.    PULMONARY:  The patient has the following factors that place them at increased risk of perioperative pulmonary complications;asthma/NEAL/BMI greater  than 27/GERD.  Postoperatively the patient would benefit from early pulmonary toilet/incentive spirometry q 1-2 hours while awake/pulse oximetry/cautious use of respiratory depressant medications such as opioids/elevate the HOB/oral hygiene.  PRE DM:  The patient has pre dm diabetes.Currently the patient manages their diabetes with oral agents ,his recent A1C was 5.8%on 9/26/23.  The morning of surgery he  was told to  hold oral antihyperglycemic.    DVT:  CAPRINI SCORE=5  The patient  has the following factors that increase his/her risk for thrombus formation ; Virchow's triad , age>55, bmi>25, htn , dm,   .    Recommendations: DVT prophylaxis  per Dr. Guerrero  protocol . SCD's, FREDDY's, and early ambulation are recommended. Heparin or LMWH is recommended for the very high risk .        Risk assessment complete.  Patient is scheduled for low surgical risk procedure.  Patient is considered an  acceptable  risk to proceed with the planned procedure.      Preoperative medication instructions were provided and reviewed with the patient.  Any additional testing or evaluation was explained to the patient.  Nothing by mouth instructions were discussed and patient's questions were answered prior to conclusion  to this encounter.  Patient verbalized understanding of preoperative instructions given in preadmission testing; discharge instructions available in EMR.    This note was dictated by a speech recognition.  Minor errors may have been detected in a speech recognition.                Electronic Signatures:  Perri Bethea (APRN-CNP)  (Signed 26-Sep-2023 20:01)   Authored: History of Present Illness, Comorbidities,  Family History, Social History, Allergies, Medications Prior to Admission, Review of Systems, Objective, Assessment and Plan, Note Completion      Last Updated: 26-Sep-2023 20:01 by Perri Bethea (APRN-CNP)

## 2023-10-04 ENCOUNTER — ANESTHESIA EVENT (OUTPATIENT)
Dept: OPERATING ROOM | Facility: HOSPITAL | Age: 58
End: 2023-10-04
Payer: COMMERCIAL

## 2023-10-05 ENCOUNTER — ANESTHESIA (OUTPATIENT)
Dept: OPERATING ROOM | Facility: HOSPITAL | Age: 58
End: 2023-10-05
Payer: COMMERCIAL

## 2023-10-05 ENCOUNTER — HOSPITAL ENCOUNTER (OUTPATIENT)
Facility: HOSPITAL | Age: 58
Setting detail: OUTPATIENT SURGERY
Discharge: HOME | End: 2023-10-05
Attending: UROLOGY | Admitting: UROLOGY
Payer: COMMERCIAL

## 2023-10-05 VITALS
DIASTOLIC BLOOD PRESSURE: 92 MMHG | SYSTOLIC BLOOD PRESSURE: 165 MMHG | OXYGEN SATURATION: 98 % | HEIGHT: 72 IN | RESPIRATION RATE: 16 BRPM | BODY MASS INDEX: 34.49 KG/M2 | TEMPERATURE: 96.8 F | WEIGHT: 254.63 LBS | HEART RATE: 75 BPM

## 2023-10-05 DIAGNOSIS — N52.9 ERECTILE DYSFUNCTION, UNSPECIFIED ERECTILE DYSFUNCTION TYPE: ICD-10-CM

## 2023-10-05 DIAGNOSIS — I86.1 SCROTAL VARICES: ICD-10-CM

## 2023-10-05 DIAGNOSIS — I86.1 VARICOCELE: Primary | ICD-10-CM

## 2023-10-05 PROBLEM — E10.9 DIABETES MELLITUS TYPE 1 (MULTI): Status: ACTIVE | Noted: 2023-10-05

## 2023-10-05 PROBLEM — E11.9 TYPE 2 DIABETES MELLITUS WITHOUT COMPLICATION (MULTI): Status: ACTIVE | Noted: 2023-10-05

## 2023-10-05 LAB — GLUCOSE BLD MANUAL STRIP-MCNC: 91 MG/DL (ref 74–99)

## 2023-10-05 PROCEDURE — 7100000010 HC PHASE TWO TIME - EACH INCREMENTAL 1 MINUTE: Performed by: UROLOGY

## 2023-10-05 PROCEDURE — 3700000002 HC GENERAL ANESTHESIA TIME - EACH INCREMENTAL 1 MINUTE: Performed by: UROLOGY

## 2023-10-05 PROCEDURE — 55899 UNLISTED PX MALE GENITAL SYS: CPT | Performed by: UROLOGY

## 2023-10-05 PROCEDURE — 3700000001 HC GENERAL ANESTHESIA TIME - INITIAL BASE CHARGE: Performed by: UROLOGY

## 2023-10-05 PROCEDURE — 3600000008 HC OR TIME - EACH INCREMENTAL 1 MINUTE - PROCEDURE LEVEL THREE: Performed by: UROLOGY

## 2023-10-05 PROCEDURE — 88304 TISSUE EXAM BY PATHOLOGIST: CPT | Mod: TC,AHULAB | Performed by: UROLOGY

## 2023-10-05 PROCEDURE — 7100000009 HC PHASE TWO TIME - INITIAL BASE CHARGE: Performed by: UROLOGY

## 2023-10-05 PROCEDURE — 2580000001 HC RX 258 IV SOLUTIONS: Performed by: UROLOGY

## 2023-10-05 PROCEDURE — 55530 REVISE SPERMATIC CORD VEINS: CPT | Performed by: UROLOGY

## 2023-10-05 PROCEDURE — 2500000005 HC RX 250 GENERAL PHARMACY W/O HCPCS

## 2023-10-05 PROCEDURE — 82947 ASSAY GLUCOSE BLOOD QUANT: CPT

## 2023-10-05 PROCEDURE — 2500000004 HC RX 250 GENERAL PHARMACY W/ HCPCS (ALT 636 FOR OP/ED)

## 2023-10-05 PROCEDURE — A55535: Performed by: ANESTHESIOLOGY

## 2023-10-05 PROCEDURE — 2500000005 HC RX 250 GENERAL PHARMACY W/O HCPCS: Performed by: UROLOGY

## 2023-10-05 PROCEDURE — 88304 TISSUE EXAM BY PATHOLOGIST: CPT | Performed by: PATHOLOGY

## 2023-10-05 PROCEDURE — 2500000004 HC RX 250 GENERAL PHARMACY W/ HCPCS (ALT 636 FOR OP/ED): Performed by: UROLOGY

## 2023-10-05 PROCEDURE — 7100000001 HC RECOVERY ROOM TIME - INITIAL BASE CHARGE: Performed by: UROLOGY

## 2023-10-05 PROCEDURE — 3600000003 HC OR TIME - INITIAL BASE CHARGE - PROCEDURE LEVEL THREE: Performed by: UROLOGY

## 2023-10-05 PROCEDURE — A4217 STERILE WATER/SALINE, 500 ML: HCPCS | Performed by: UROLOGY

## 2023-10-05 PROCEDURE — 7100000002 HC RECOVERY ROOM TIME - EACH INCREMENTAL 1 MINUTE: Performed by: UROLOGY

## 2023-10-05 PROCEDURE — 88304 TISSUE EXAM BY PATHOLOGIST: CPT | Mod: TC,SUR,AHULAB,CMCLAB | Performed by: UROLOGY

## 2023-10-05 PROCEDURE — A55535

## 2023-10-05 PROCEDURE — 2720000007 HC OR 272 NO HCPCS: Performed by: UROLOGY

## 2023-10-05 PROCEDURE — 2500000001 HC RX 250 WO HCPCS SELF ADMINISTERED DRUGS (ALT 637 FOR MEDICARE OP): Performed by: UROLOGY

## 2023-10-05 RX ORDER — CEFAZOLIN SODIUM 2 G/100ML
2 INJECTION, SOLUTION INTRAVENOUS EVERY 8 HOURS
Status: DISCONTINUED | OUTPATIENT
Start: 2023-10-05 | End: 2023-10-05

## 2023-10-05 RX ORDER — OXYCODONE HYDROCHLORIDE 5 MG/1
5 TABLET ORAL EVERY 4 HOURS PRN
Status: DISCONTINUED | OUTPATIENT
Start: 2023-10-05 | End: 2023-10-05 | Stop reason: HOSPADM

## 2023-10-05 RX ORDER — MIDAZOLAM HYDROCHLORIDE 1 MG/ML
INJECTION INTRAMUSCULAR; INTRAVENOUS AS NEEDED
Status: DISCONTINUED | OUTPATIENT
Start: 2023-10-05 | End: 2023-10-05

## 2023-10-05 RX ORDER — ACETAMINOPHEN 325 MG/1
650 TABLET ORAL EVERY 6 HOURS
Qty: 40 TABLET | Refills: 0 | Status: SHIPPED | OUTPATIENT
Start: 2023-10-05 | End: 2023-10-10

## 2023-10-05 RX ORDER — CELECOXIB 200 MG/1
400 CAPSULE ORAL ONCE
Status: COMPLETED | OUTPATIENT
Start: 2023-10-05 | End: 2023-10-05

## 2023-10-05 RX ORDER — PROPOFOL 10 MG/ML
INJECTION, EMULSION INTRAVENOUS AS NEEDED
Status: DISCONTINUED | OUTPATIENT
Start: 2023-10-05 | End: 2023-10-05

## 2023-10-05 RX ORDER — BUPIVACAINE HYDROCHLORIDE 5 MG/ML
INJECTION, SOLUTION PERINEURAL AS NEEDED
Status: DISCONTINUED | OUTPATIENT
Start: 2023-10-05 | End: 2023-10-05 | Stop reason: HOSPADM

## 2023-10-05 RX ORDER — HYDRALAZINE HYDROCHLORIDE 20 MG/ML
5 INJECTION INTRAMUSCULAR; INTRAVENOUS EVERY 30 MIN PRN
Status: DISCONTINUED | OUTPATIENT
Start: 2023-10-05 | End: 2023-10-05 | Stop reason: HOSPADM

## 2023-10-05 RX ORDER — ROCURONIUM BROMIDE 10 MG/ML
INJECTION, SOLUTION INTRAVENOUS AS NEEDED
Status: DISCONTINUED | OUTPATIENT
Start: 2023-10-05 | End: 2023-10-05

## 2023-10-05 RX ORDER — DEXAMETHASONE SODIUM PHOSPHATE 4 MG/ML
INJECTION, SOLUTION INTRA-ARTICULAR; INTRALESIONAL; INTRAMUSCULAR; INTRAVENOUS; SOFT TISSUE AS NEEDED
Status: DISCONTINUED | OUTPATIENT
Start: 2023-10-05 | End: 2023-10-05

## 2023-10-05 RX ORDER — HYDROMORPHONE HYDROCHLORIDE 1 MG/ML
INJECTION, SOLUTION INTRAMUSCULAR; INTRAVENOUS; SUBCUTANEOUS AS NEEDED
Status: DISCONTINUED | OUTPATIENT
Start: 2023-10-05 | End: 2023-10-05

## 2023-10-05 RX ORDER — SODIUM CHLORIDE, SODIUM LACTATE, POTASSIUM CHLORIDE, CALCIUM CHLORIDE 600; 310; 30; 20 MG/100ML; MG/100ML; MG/100ML; MG/100ML
100 INJECTION, SOLUTION INTRAVENOUS CONTINUOUS
Status: DISCONTINUED | OUTPATIENT
Start: 2023-10-05 | End: 2023-10-05 | Stop reason: HOSPADM

## 2023-10-05 RX ORDER — LIDOCAINE HYDROCHLORIDE 20 MG/ML
INJECTION, SOLUTION EPIDURAL; INFILTRATION; INTRACAUDAL; PERINEURAL AS NEEDED
Status: DISCONTINUED | OUTPATIENT
Start: 2023-10-05 | End: 2023-10-05

## 2023-10-05 RX ORDER — IBUPROFEN 600 MG/1
600 TABLET ORAL EVERY 6 HOURS
Qty: 20 TABLET | Refills: 0 | Status: SHIPPED | OUTPATIENT
Start: 2023-10-05 | End: 2023-10-10

## 2023-10-05 RX ORDER — POLYETHYLENE GLYCOL 3350 17 G/17G
17 POWDER, FOR SOLUTION ORAL DAILY
Qty: 30 PACKET | Refills: 0 | Status: SHIPPED | OUTPATIENT
Start: 2023-10-05 | End: 2023-11-04

## 2023-10-05 RX ORDER — OXYCODONE HYDROCHLORIDE 5 MG/1
5 CAPSULE ORAL EVERY 6 HOURS PRN
Qty: 15 CAPSULE | Refills: 0 | Status: SHIPPED | OUTPATIENT
Start: 2023-10-05 | End: 2023-10-10

## 2023-10-05 RX ORDER — GABAPENTIN 300 MG/1
600 CAPSULE ORAL ONCE
Status: COMPLETED | OUTPATIENT
Start: 2023-10-05 | End: 2023-10-05

## 2023-10-05 RX ORDER — FENTANYL CITRATE 50 UG/ML
INJECTION, SOLUTION INTRAMUSCULAR; INTRAVENOUS AS NEEDED
Status: DISCONTINUED | OUTPATIENT
Start: 2023-10-05 | End: 2023-10-05

## 2023-10-05 RX ORDER — CEFAZOLIN SODIUM 2 G/100ML
2 INJECTION, SOLUTION INTRAVENOUS ONCE
Status: DISCONTINUED | OUTPATIENT
Start: 2023-10-05 | End: 2023-10-05 | Stop reason: HOSPADM

## 2023-10-05 RX ORDER — SODIUM CHLORIDE 0.9 G/100ML
IRRIGANT IRRIGATION AS NEEDED
Status: DISCONTINUED | OUTPATIENT
Start: 2023-10-05 | End: 2023-10-05 | Stop reason: HOSPADM

## 2023-10-05 RX ORDER — LIDOCAINE HYDROCHLORIDE 10 MG/ML
0.1 INJECTION, SOLUTION EPIDURAL; INFILTRATION; INTRACAUDAL; PERINEURAL ONCE
Status: DISCONTINUED | OUTPATIENT
Start: 2023-10-05 | End: 2023-10-05 | Stop reason: HOSPADM

## 2023-10-05 RX ORDER — HYDROMORPHONE HYDROCHLORIDE 1 MG/ML
1 INJECTION, SOLUTION INTRAMUSCULAR; INTRAVENOUS; SUBCUTANEOUS EVERY 5 MIN PRN
Status: DISCONTINUED | OUTPATIENT
Start: 2023-10-05 | End: 2023-10-05 | Stop reason: HOSPADM

## 2023-10-05 RX ORDER — CEFAZOLIN 1 G/1
INJECTION, POWDER, FOR SOLUTION INTRAVENOUS AS NEEDED
Status: DISCONTINUED | OUTPATIENT
Start: 2023-10-05 | End: 2023-10-05

## 2023-10-05 RX ORDER — ACETAMINOPHEN 325 MG/1
975 TABLET ORAL ONCE
Status: COMPLETED | OUTPATIENT
Start: 2023-10-05 | End: 2023-10-05

## 2023-10-05 RX ORDER — DIPHENHYDRAMINE HYDROCHLORIDE 50 MG/ML
12.5 INJECTION INTRAMUSCULAR; INTRAVENOUS ONCE AS NEEDED
Status: DISCONTINUED | OUTPATIENT
Start: 2023-10-05 | End: 2023-10-05 | Stop reason: HOSPADM

## 2023-10-05 RX ORDER — ONDANSETRON HYDROCHLORIDE 2 MG/ML
4 INJECTION, SOLUTION INTRAVENOUS ONCE AS NEEDED
Status: DISCONTINUED | OUTPATIENT
Start: 2023-10-05 | End: 2023-10-05 | Stop reason: HOSPADM

## 2023-10-05 RX ORDER — ONDANSETRON HYDROCHLORIDE 2 MG/ML
INJECTION, SOLUTION INTRAVENOUS AS NEEDED
Status: DISCONTINUED | OUTPATIENT
Start: 2023-10-05 | End: 2023-10-05

## 2023-10-05 RX ORDER — LABETALOL HYDROCHLORIDE 5 MG/ML
5 INJECTION, SOLUTION INTRAVENOUS ONCE AS NEEDED
Status: DISCONTINUED | OUTPATIENT
Start: 2023-10-05 | End: 2023-10-05 | Stop reason: HOSPADM

## 2023-10-05 RX ADMIN — MIDAZOLAM HYDROCHLORIDE 2 MG: 1 INJECTION, SOLUTION INTRAMUSCULAR; INTRAVENOUS at 07:30

## 2023-10-05 RX ADMIN — CEFAZOLIN SODIUM 2 G: 1 POWDER, FOR SOLUTION INTRAMUSCULAR; INTRAVENOUS at 07:46

## 2023-10-05 RX ADMIN — FENTANYL CITRATE 100 MCG: 50 INJECTION, SOLUTION INTRAMUSCULAR; INTRAVENOUS at 07:39

## 2023-10-05 RX ADMIN — PROPOFOL 200 MG: 10 INJECTION, EMULSION INTRAVENOUS at 07:39

## 2023-10-05 RX ADMIN — LIDOCAINE HYDROCHLORIDE 80 MG: 20 INJECTION, SOLUTION EPIDURAL; INFILTRATION; INTRACAUDAL; PERINEURAL at 07:39

## 2023-10-05 RX ADMIN — GABAPENTIN 600 MG: 300 CAPSULE ORAL at 07:11

## 2023-10-05 RX ADMIN — ROCURONIUM BROMIDE 70 MG: 10 INJECTION INTRAVENOUS at 07:41

## 2023-10-05 RX ADMIN — ONDANSETRON 4 MG: 2 INJECTION INTRAMUSCULAR; INTRAVENOUS at 09:01

## 2023-10-05 RX ADMIN — SODIUM CHLORIDE, POTASSIUM CHLORIDE, SODIUM LACTATE AND CALCIUM CHLORIDE 100 ML/HR: 600; 310; 30; 20 INJECTION, SOLUTION INTRAVENOUS at 07:12

## 2023-10-05 RX ADMIN — ACETAMINOPHEN 975 MG: 325 TABLET, FILM COATED ORAL at 07:11

## 2023-10-05 RX ADMIN — SUGAMMADEX 200 MG: 100 INJECTION, SOLUTION INTRAVENOUS at 09:19

## 2023-10-05 RX ADMIN — CELECOXIB 400 MG: 200 CAPSULE ORAL at 07:11

## 2023-10-05 RX ADMIN — HYDROMORPHONE HYDROCHLORIDE 0.4 MG: 1 INJECTION, SOLUTION INTRAMUSCULAR; INTRAVENOUS; SUBCUTANEOUS at 09:16

## 2023-10-05 RX ADMIN — DEXAMETHASONE SODIUM PHOSPHATE 8 MG: 4 INJECTION, SOLUTION INTRAMUSCULAR; INTRAVENOUS at 08:05

## 2023-10-05 RX ADMIN — HYDROMORPHONE HYDROCHLORIDE 0.2 MG: 1 INJECTION, SOLUTION INTRAMUSCULAR; INTRAVENOUS; SUBCUTANEOUS at 09:05

## 2023-10-05 ASSESSMENT — PAIN SCALES - GENERAL
PAINLEVEL_OUTOF10: 0 - NO PAIN

## 2023-10-05 ASSESSMENT — COLUMBIA-SUICIDE SEVERITY RATING SCALE - C-SSRS
2. HAVE YOU ACTUALLY HAD ANY THOUGHTS OF KILLING YOURSELF?: NO
6. HAVE YOU EVER DONE ANYTHING, STARTED TO DO ANYTHING, OR PREPARED TO DO ANYTHING TO END YOUR LIFE?: NO
1. IN THE PAST MONTH, HAVE YOU WISHED YOU WERE DEAD OR WISHED YOU COULD GO TO SLEEP AND NOT WAKE UP?: NO

## 2023-10-05 ASSESSMENT — PAIN - FUNCTIONAL ASSESSMENT
PAIN_FUNCTIONAL_ASSESSMENT: 0-10

## 2023-10-05 NOTE — ANESTHESIA PROCEDURE NOTES
Airway  Date/Time: 10/5/2023 7:49 AM  Urgency: elective    Airway not difficult    Staffing  Performed: CR   Authorized by: Franki Koenig MD    Performed by: CR Alvarado  Patient location during procedure: OR    Indications and Patient Condition  Indications for airway management: anesthesia  Spontaneous Ventilation: absent  Sedation level: deep  Preoxygenated: yes  Patient position: reverse Trendelenburg  MILS not maintained throughout  Mask difficulty assessment: 2 - vent by mask + OA or adjuvant +/- NMBA    Final Airway Details  Final airway type: endotracheal airway      Successful airway: ETT  Cuffed: yes   Successful intubation technique: video laryngoscopy  Facilitating devices/methods: cricoid pressure  Blade size: #4  ETT size (mm): 7.5  Cormack-Lehane Classification: grade IIb - view of arytenoids or posterior of glottis only  Placement verified by: chest auscultation and capnometry   Measured from: lips  ETT to lips (cm): 23  Number of attempts at approach: 2  Ventilation between attempts: BVM    Additional Comments  1st attempt DL with Mac 4 blade by SHIVA, unable to visualize airway anatomy. Second attempt by Physician Anesthesiologist with Mac 4 Blade, poor grade III view with cricoid pressure. Glidescope size 4 blade used, grade Iib view with cricoid obtained by SHIVA, successful intubation.

## 2023-10-05 NOTE — DISCHARGE INSTRUCTIONS
DEPARTMENT OF UROLOGY  DISCHARGE INSTRUCTIONS Varicocelectomy and Spermatic Cord Denervation  Outpatient Surgery    C O N F I D E N T I A L   I N F O R M A T I O N    Woodrow Rodriguez    Call [phone] during regular daytime business hours (8:00 am - 5:00 pm) and after 5:00 pm and ask for the Urology Resident with any questions or concerns.      If it is a life-threatening situation, proceed to the nearest emergency department.        Follow-up appointment:  10- with KIRTI Birch     Thank you for the opportunity to care for you today.  Your health and healing are very important to us.  We hope we made you feel as comfortable as possible and are committed to your recovery and continued well-being.      The following is a brief overview of your varicocelectomy surgery today. Some of the information contained on this summary may be confidential.  This information should be kept in your records and should be shared with your regular doctor.    Physicians:   Dr. Vicente      Procedure performed: removal of varicocele, cord denervation, removal of cord lipoma    What to Expect During your Recovery and Home Care  Anesthesia Side Effects   You received anesthesia today.  You may feel sleepy, tired, or have a sore throat.   You may also feel drowsiness, dizziness, or inability to think clearly.  For your safety, do not drive, drink alcoholic beverages, take any unprescribed medication or make any important decisions for 24 hours.  A responsible adult should be with you for 24 hours.        Activity and Recovery    No heavy lifting for 7-10 days. Wear supportive underwear (jock strap or snug briefs) to help support scrotum. Elevate your scrotum with towel when laying down to prevent swelling. The more you are up and moving around the more your scrotum will swell. Place an ice pack on top of underwear, never put ice pack directly on skin. Do not leave ice pack on longer then 10 to 15 minutes. Ice area of  and on over the next few days.     Do not drive or operate heavy machinery while taking narcotic pain medications as these medications can alter perception, impair judgement, and slow reaction times.    No sexual activity until cleared at your follow up appointment.    Pain Control  Unfortunately, you may experience pain after your procedure.  Adequate management can include alternative measures to help ease your pain and can include ice packs, over the counter Tylenol or oxycodone can be taken as prescribed as needed for breakthrough pain. Do not take more then 4,000mg of Tylenol in a 24-hour period.     Oxycodone is a narcotic and can become addictive and may also induce constipation.  Please take stool softeners when taking this medication to prevent constipation.    Nausea/Vomiting   Clear liquids are best tolerated at first. Start slow, advance your diet as tolerated to normal foods. Avoid spicy, greasy, heavy foods at first. Also, you may feel nauseous or like you need to vomit if you take any type of medication on an empty stomach.  Call your physician if you are unable to eat or drink and have persistent vomiting.    Signs of Bleeding   Minor bleeding or drainage may occur from the surgical incision; however, excessive or consistent bleeding should be reported to your surgeon. Excessive bleeding is defined as blood that is dripping from wound, soaking you bandages, and is ketchup colored, thick with possible blood clots.  Consistent is defined as bleeding that does not stop.      Treatment/wound care:   Keep area(s) clean and dry.   It is okay to shower 24 hours after time of surgery.    Do not scrub wound(s), pat dry.    Do not submerge wound(s) in standing water until seen for follow up appointment (no tub bathing, swimming, or hot tubs).    Clean with mild soap, gentle washing, pat dry, cover with gauze as needed.    No oils or lotions on incisions, you can apply bacitracin ointment to your incisions.    Sutures will dissolve on their own.    Please visually inspect your wound(s) at least once daily.  If the wound(s) are in a difficult to see location, please use a mirror or have someone else assist with visual inspection.    Signs of Infection  Signs of infection can include fever, excessive swelling, heat, drainage, redness, or severe pain at incision site. If you see any of these occur, please contact your doctor's office at [phone]. Any fever higher than 100.4, especially if associated with an ill feeling, abdominal pain, chills, or nausea should be reported to your surgeon.      Assist in bowel movements/urination  Increase fiber in diet  Urination should occur within 6 hours of anesthesia.   Increase water (6 to 8 glasses)  Increase walking   If you have tried these methods and your bladder still feels full and you cannot use the bathroom, please go to your nearest Emergency room.    Additional Instructions:   At your follow up visit we will check your incision for infection.

## 2023-10-05 NOTE — OP NOTE
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  Roby Radha    Procedure Summary  Anesthesia: General  ASA: II  Anesthesia Staff: Anesthesiologist: Franki Koenig MD  CRNA: DEEPTI Bonilla-CRNA  C-AA: CR Alvarado  Estimated Blood Loss: 5mL  Intra-op Medications:   Medication Name Total Dose   BUPivacaine HCl (Marcaine) 0.5 % (5 mg/mL) injection 30 mL   sodium chloride 0.9 % irrigation solution 1,000 mL   lactated Ringer's infusion Cannot be calculated          Anesthesia Record               Intraprocedure I/O Totals          Intake    lactated Ringer's infusion 700.00 mL    Total Intake 700 mL       Output    Est. Blood Loss 5 mL    Total Output 5 mL       Net    Net Volume 695 mL          Specimen:   ID Type Source Tests Collected by Time   1 : LEFT CORD LIPOMA Tissue SPERMATIC CORD LEFT SURGICAL PATHOLOGY EXAM Judith Vicente MD 10/5/2023 0854        Staff:   Circulator: Jayna MASON RN; Zuly Savage RN  Scrub Person: Franki Almendarez      Drains and/or Catheters: None    Tourniquet Times: None      Implants: None    Findings: 1 artery, 2 veins    Indications: Woodrow Rodriguez is an 58 y.o. male who is having surgery for a left varicocele.    The patient was seen in the preoperative area. The risks, benefits, complications, treatment options, non-operative alternatives, expected recovery and outcomes were discussed with the patient. The  possibilities of reaction to medication, pulmonary aspiration, injury to surrounding structures, bleeding, recurrent infection, the need for additional procedures, failure to diagnose a condition, and creating a complication requiring transfusion or operation were discussed with the patient. The patient concurred with the proposed plan, giving informed consent.  The site of surgery was properly noted/marked if necessary per policy. The patient has been actively warmed in preoperative area. Preoperative antibiotics have been ordered and given within 1 hours of incision. Venous thrombosis prophylaxis have been ordered including bilateral sequential compression devices    Procedure Details:     [facility]  Operative Report    Woodrow Rodriguez  [id number]  1965 (58 y.o.)  Date of Surgery: 10/5/23    Surgeon: Judith Vicente MD  Resident / Assistant: Rei Keating MD     Preoperative diagnosis: left varicocele  Postoperative diagnosis: left varicocele    Procedures performed:  Microsurgical left varicocelectomy  Spermatic Cord Denervation  Left vasectomy  Left spermatic cord lipoma removal    Anesthesia: general  IV Fluids: 700  Estimated Blood Loss (mL): Minimal  Blood Replacement: No   Specimen: Sent left cord lipoma  Complications: No   Drains and/or Catheters: None  Findings: 1 artery 2 veins    Pt is an 58 y.o. M with a history of left varicocele. The patient was counseled regarding treatment options and elected to undergo varicocelectomy with spermatic cord denervation. He is made aware of the risks of the procedure, including but not limited to, bleeding, infection, damage to surrounding vessels or organs, need for additional surgery, damage to the testicular artery resulting in testicular atrophy or infarction, recurrence of varicoceles, inability to improve fertility and inability to achieve pregnancy. The patient understood these risks and signed informed consent to  proceed.    DESCRIPTION OF PROCEDURE: Once informed consent was obtained, the patient was brought to the operating room and placed in the supine position. A preoperative timeout was performed confirming the patients identity as well as the procedure to be performed. Ancef was administered for antibiotic prophylaxis. SCDs were placed for DVT prophylaxis. Following the induction of general anesthesia, the patient was shaved, prepped and draped in the standard sterile fashion for varicocelectomy.     A marking pen was used to delineate left inguinal incision approximately 4cm in length and along Langers lines. Starting on the patients left side, a 15-blade was used to incise the skin. Electrocautery was used to carry the incision down through subcutaneous tissue and Scarpas fascia to the level of the external oblique fascia. The external ring and spermatic cord were identified. A 15-blade was used to nick the external oblique fascia and the incision was extended proximally and distally towards the internal and external rings. The external ring was left intact. The spermatic cord was then identified and bluntly elevated to the level of the skin incision.  Surrounding cremasteric fibers were then bluntly dissected off the cord, and the cord was further dissected off surrounding fat. An [employer] retractor was used to drain was used to secure and elevate the cord.     We then brought in the operative microscope into the surgical field. Two dilated veins were identified. These were each tied off with 3-0 silk ties each. The testicular artery was similarly identified and spared. It was identified by visual inspection as well as the doppler microvascular probe. After tying off each of the dilated veins, patency of the testicular artery was again re-identified using the Doppler probe. We also spared any lymphatics present within the spermatic cord. We then performed the spermatic cord denervation by utilizing bipolar cautery  to remove all remaining tissue within the cord but sparing the arteries and lymphatics. The vas deferens was identified and incised. The ends were cauterized.     We then addressed the surrounding cord lipoma. There was diffuse areas of fatty tissue which was removed. This lipoma was sent for pathology. The wound was then irrigated and hemostasis was obtained.     We then proceeded with closure of the wound. The external oblique fascia was closed with 2-0 Vicryl in a running fashion. Richard's fascia was closed with 3-0 vicryl. The subcutaneous tissue was closed with an additional layer of 3-0 vicryl . Additional Marcaine,was injected within Scarpas fascia and the subcutaneous tissue. The skin was then closed with 4-0 Monocryl in a subcuticular fashion. The patient was cleaned and dried. Dermabond was applied to the incisions. The patient tolerated the procedure well and was transferred to the postoperative care unit in stable condition. All sponge, instrument, and needle counts were correct at the end of the operation.    Complications:  None; patient tolerated the procedure well.    Disposition: PACU - hemodynamically stable.  Condition: stable

## 2023-10-05 NOTE — ANESTHESIA POSTPROCEDURE EVALUATION
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])    Surgeons: Judith Vicente MD Responsible Provider: Franki Koenig MD    Anesthesia Type: general ASA Status: 2            Anesthesia Type: general    Vitals Value Taken Time   /91 10/05/23 0949   Temp 36 10/05/23 0949   Pulse 67 10/05/23 0949   Resp 16 10/05/23 0949   SpO2 99 10/05/23 0949       Anesthesia Post Evaluation    Patient location during evaluation: PACU  Patient participation: complete - patient participated  Level of consciousness: awake and alert  Pain management: satisfactory to patient  Multimodal analgesia pain management approach  Airway patency: patent  Cardiovascular status: acceptable and blood pressure returned to baseline  Respiratory status: acceptable  Hydration status: acceptable      There were no known notable events for this encounter.

## 2023-10-05 NOTE — ANESTHESIA PREPROCEDURE EVALUATION
Patient: Woodrow Rodriguez    Procedure Information       Date/Time: 10/05/23 0730    Procedure: Left Varicocelectomy; Spermatic Cord Denervation (Left) - Ancef 1 gram IV, MICROVASCULAR DOPPLER, OPERATING/TABLE SCOPE    [facility]    Surgeons: Judith Vicente MD            Relevant Problems   Anesthesia (within normal limits)      Cardiovascular   (+) Benign essential hypertension      Endocrine   (+) Type 2 diabetes mellitus without complication (CMS/HCC)      /Renal (within normal limits)      Neuro/Psych   (+) Bilateral carpal tunnel syndrome      Pulmonary   (+) Asthma without status asthmaticus   (+) Obstructive sleep apnea      Musculoskeletal   (+) Bilateral carpal tunnel syndrome      Infectious Disease   (+) Herpes labialis       Clinical information reviewed:   Tobacco  Allergies  Meds   Med Hx  Surg Hx   Fam Hx  Soc Hx        NPO/Void Status  Date of Last Liquid: 10/04/23  Time of Last Liquid: 2230  Date of Last Solid: 10/05/23  Time of Last Solid: 0410  Time of Last Void: 0500           Past Medical History:   Diagnosis Date    Waterman's esophagus without dysplasia     Waterman's esophagus    Mild intermittent asthma, uncomplicated 11/18/2020    Reactive airway disease, mild intermittent, uncomplicated    Nasal congestion 05/22/2023    Obesity (BMI 30-39.9) 05/22/2023    Personal history of other diseases of the digestive system     History of hemorrhoids    Spondylosis without myelopathy or radiculopathy, cervical region 02/05/2014    Cervical spondylosis    Unspecified abdominal hernia without obstruction or gangrene     Hernia      Past Surgical History:   Procedure Laterality Date    NECK SURGERY  08/27/2013    Neck Surgery c5-7 fusion    OTHER SURGICAL HISTORY  01/19/2021    Colonoscopy    OTHER SURGICAL HISTORY  01/19/2021    Endoscopy    OTHER SURGICAL HISTORY  01/19/2021    Vertebral fusion    VASECTOMY  07/17/2013    Surgery Vas Deferens Vasectomy      Social History     Tobacco Use    Smoking status: Former     Types: Cigarettes    Smokeless tobacco: Never      Current Outpatient Medications   Medication Instructions    albuterol 90 mcg/actuation inhaler 2 puffs, inhalation, Every 6 hours PRN    amLODIPine (NORVASC) 5 mg, oral, Daily RT    EPINEPHrine 1 mg/10mL syringe EPINEPHrine 1 MG/10ML Injection Solution Prefilled Syringe  Refills: 0    esomeprazole (NexIUM) 40 mg DR capsule TAKE ONE CAPSULE (40 MG) BY MOUTH ONCE EVERY DAY    fluticasone (Flonase) 50 mcg/actuation nasal spray 2 sprays, Each Nostril, Daily, 1 hour before bedtime.     losartan-hydrochlorothiazide (Hyzaar) 50-12.5 mg tablet 1 tablet, oral, Daily    metFORMIN XR (GLUCOPHAGE-XR) 1,000 mg, oral, Daily with breakfast, Do not crush, chew, or split.    metoprolol succinate XL (Toprol XL) 50 mg 24 hr tablet oral    psyllium (Metamucil) 0.4 gram capsule 1 capsule, oral    rosuvastatin (CRESTOR) 40 mg, oral, Daily    sodium,potassium,mag sulfates (Suprep) 17.5-3.13-1.6 gram recon soln solution oral    valACYclovir (Valtrex) 1 gram tablet 2 tablets, oral, Repeat in 12 hours, as needed for mouth sores    valACYclovir (VALTREX) 500 mg, oral      Allergies   Allergen Reactions    Animal Dander Other    Bee Venom Protein (Honey Bee) Unknown    Cat Dander Unknown        Chemistry    Lab Results   Component Value Date/Time    NA CANCELED 09/26/2023 0821    K CANCELED 09/26/2023 0821    CL CANCELED 09/26/2023 0821    CO2 CANCELED 09/26/2023 0821    BUN CANCELED 09/26/2023 0821    CREATININE CANCELED 09/26/2023 0821    Lab Results   Component Value Date/Time    CALCIUM CANCELED 09/26/2023 0821    ALKPHOS CANCELED 09/26/2023 0821    AST CANCELED 09/26/2023 0821    ALT CANCELED 09/26/2023 0821    BILITOT CANCELED 09/26/2023 0821          Lab Results   Component Value Date/Time    WBC CANCELED 09/26/2023 0821    HGB CANCELED 09/26/2023 0821    HCT CANCELED 09/26/2023 0821    PLT CANCELED 09/26/2023 0821     No  "results found for: \"PROTIME\", \"PTT\", \"INR\"  No results found for this or any previous visit (from the past 4464 hour(s)).  No results found for this or any previous visit from the past 1095 days.       Visit Vitals  Smoking Status Former        Anesthesia Evaluation      Airway   Mallampati: II  TM distance: >3 FB  Dental - normal exam     Pulmonary - normal exam   Cardiovascular - normal exam    Neuro/Psych      GI/Hepatic/Renal      Endo/Other    Abdominal  - normal exam                      Physical Exam    Airway  Mallampati: II  TM distance: >3 FB     Cardiovascular - normal exam     Dental - normal exam     Pulmonary - normal exam     Abdominal - normal exam              Anesthesia Plan    ASA 2     general     intravenous induction   Anesthetic plan and risks discussed with patient.    Plan discussed with CAA.        "

## 2023-10-13 LAB
LABORATORY COMMENT REPORT: NORMAL
PATH REPORT.FINAL DX SPEC: NORMAL
PATH REPORT.GROSS SPEC: NORMAL
PATH REPORT.RELEVANT HX SPEC: NORMAL
PATH REPORT.TOTAL CANCER: NORMAL

## 2023-10-19 ENCOUNTER — OFFICE VISIT (OUTPATIENT)
Dept: UROLOGY | Facility: CLINIC | Age: 58
End: 2023-10-19
Payer: COMMERCIAL

## 2023-10-19 VITALS — HEIGHT: 72 IN | WEIGHT: 250.8 LBS | BODY MASS INDEX: 33.97 KG/M2 | TEMPERATURE: 97.8 F

## 2023-10-19 DIAGNOSIS — I86.1 LEFT VARICOCELE: Primary | ICD-10-CM

## 2023-10-19 PROCEDURE — 1036F TOBACCO NON-USER: CPT | Performed by: NURSE PRACTITIONER

## 2023-10-19 PROCEDURE — 99024 POSTOP FOLLOW-UP VISIT: CPT | Performed by: NURSE PRACTITIONER

## 2023-10-19 PROCEDURE — 3046F HEMOGLOBIN A1C LEVEL >9.0%: CPT | Performed by: NURSE PRACTITIONER

## 2023-10-19 ASSESSMENT — COLUMBIA-SUICIDE SEVERITY RATING SCALE - C-SSRS
1. IN THE PAST MONTH, HAVE YOU WISHED YOU WERE DEAD OR WISHED YOU COULD GO TO SLEEP AND NOT WAKE UP?: NO
2. HAVE YOU ACTUALLY HAD ANY THOUGHTS OF KILLING YOURSELF?: NO
6. HAVE YOU EVER DONE ANYTHING, STARTED TO DO ANYTHING, OR PREPARED TO DO ANYTHING TO END YOUR LIFE?: NO

## 2023-10-19 ASSESSMENT — ENCOUNTER SYMPTOMS
LOSS OF SENSATION IN FEET: 0
DEPRESSION: 0
OCCASIONAL FEELINGS OF UNSTEADINESS: 0

## 2023-10-19 ASSESSMENT — PATIENT HEALTH QUESTIONNAIRE - PHQ9
2. FEELING DOWN, DEPRESSED OR HOPELESS: NOT AT ALL
1. LITTLE INTEREST OR PLEASURE IN DOING THINGS: NOT AT ALL
SUM OF ALL RESPONSES TO PHQ9 QUESTIONS 1 AND 2: 0

## 2023-10-19 ASSESSMENT — PAIN SCALES - GENERAL: PAINLEVEL: 0-NO PAIN

## 2023-10-19 NOTE — PROGRESS NOTES
[facility]  Outpatient Clinic Note      Patient: Woodrow Rodriguez  Age/Sex: 58 y.o., male  MRN: [id number]      History of Present Illness  58-year-old gentleman with history of left varicocele now s/p microsurgical left varicocelectomy, spermatic cord denervation, left vasectomy and left spermatic cord lipoma removal with Dr. Judith Vicente . He reports he has been doing well since surgery. He is eating and drinking, as normal. Urine has remained clear, yellow. Bowels have returned to normal. No drainage from incision site. He is ambulating at baseline. No fevers or chills.    Past Medical & Surgical History  Past Medical History:   Diagnosis Date    Waterman's esophagus without dysplasia     Waterman's esophagus    Mild intermittent asthma, uncomplicated 11/18/2020    Reactive airway disease, mild intermittent, uncomplicated    Nasal congestion 05/22/2023    Obesity (BMI 30-39.9) 05/22/2023    Personal history of other diseases of the digestive system     History of hemorrhoids    Spondylosis without myelopathy or radiculopathy, cervical region 02/05/2014    Cervical spondylosis    Unspecified abdominal hernia without obstruction or gangrene     Hernia      Past Surgical History:   Procedure Laterality Date    NECK SURGERY  08/27/2013    Neck Surgery    OTHER SURGICAL HISTORY  01/19/2021    Colonoscopy    OTHER SURGICAL HISTORY  01/19/2021    Endoscopy    OTHER SURGICAL HISTORY  01/19/2021    Vertebral fusion    VASECTOMY  07/17/2013    Surgery Vas Deferens Vasectomy          Labs  NA    Medications:  Current Outpatient Medications on File Prior to Visit   Medication Sig Dispense Refill    albuterol 90 mcg/actuation inhaler Inhale 2 puffs every 6 hours if needed for wheezing. 18 g 3    amLODIPine (Norvasc) 5 mg tablet Take 1 tablet (5 mg) by mouth once daily.      EPINEPHrine 1 mg/10mL syringe EPINEPHrine 1 MG/10ML Injection Solution Prefilled Syringe  Refills: 0      esomeprazole (NexIUM) 40 mg   capsule TAKE ONE CAPSULE (40 MG) BY MOUTH ONCE EVERY DAY 90 capsule 3    fluticasone (Flonase) 50 mcg/actuation nasal spray Administer 2 sprays into each nostril once daily. 1 hour before bedtime.      losartan-hydrochlorothiazide (Hyzaar) 50-12.5 mg tablet Take 1 tablet by mouth once daily.      metFORMIN XR (Glucophage-XR) 500 mg 24 hr tablet Take 2 tablets (1,000 mg) by mouth once daily with a meal. Do not crush, chew, or split. 60 tablet 11    metoprolol succinate XL (Toprol XL) 50 mg 24 hr tablet Take by mouth.      polyethylene glycol (Glycolax, Miralax) 17 gram packet Take 17 g by mouth once daily. 30 packet 0    psyllium (Metamucil) 0.4 gram capsule Take 1 capsule by mouth.      rosuvastatin (Crestor) 40 mg tablet Take 1 tablet (40 mg) by mouth once daily.      sodium,potassium,mag sulfates (Suprep) 17.5-3.13-1.6 gram recon soln solution Take by mouth.      valACYclovir (Valtrex) 1 gram tablet Take 2 tablets (2,000 mg) by mouth. Repeat in 12 hours, as needed for mouth sores      valACYclovir (Valtrex) 500 mg tablet Take 1 tablet (500 mg) by mouth.       No current facility-administered medications on file prior to visit.          Physical Exam                                                                                                                      General: Well developed, well nourished, alert and cooperative, appears in no acute distress  Eyes: Non-injected conjunctiva, sclera clear, no proptosis  Cardiac: Extremities are warm and well perfused. No edema, cyanosis or pallor.   Lungs: Breathing is easy, non-labored. Speaking in clear and complete sentences. Normal diaphragmatic movement.  MSK: Ambulatory with steady gait, unassisted  Neuro: alert and oriented to person, place and time  Psych: Demonstrates good judgement and reason, without hallucinations, abnormal affect or abnormal behaviors.  Skin: no obvious lesions, no rashes  L Inguinal incision is clean, dry and well approximated without  erythema or drainage    Review of Systems  Review of Systems   All other systems reviewed and are negative.         Imaging  NA      Assessment & Plan  58-year-old gentleman with history of left varicocele now s/p microsurgical left varicocelectomy, spermatic cord denervation, left vasectomy and left spermatic cord lipoma removal with Dr. Judith Vicente . He reports he has been doing well since surgery. He is eating and drinking, as normal. Urine has remained clear, yellow. Bowels have returned to normal. No drainage from incision site. He is ambulating at baseline. No fevers or chills.    Incision is healing well. He may resume normal activity. Discussed postoperative care and instructions.    Follow-up 2-3 months for POV with Dr. Judith Vicente, or sooner if needed.      Reviewed and approved by JIMENA RASMUSSEN on 10/19/23 at 8:16 AM.

## 2024-01-16 DIAGNOSIS — I10 PRIMARY HYPERTENSION: Primary | ICD-10-CM

## 2024-01-16 RX ORDER — LOSARTAN POTASSIUM AND HYDROCHLOROTHIAZIDE 12.5; 5 MG/1; MG/1
1 TABLET ORAL DAILY
Qty: 90 TABLET | Refills: 3 | Status: SHIPPED | OUTPATIENT
Start: 2024-01-16

## 2024-01-23 ENCOUNTER — OFFICE VISIT (OUTPATIENT)
Dept: UROLOGY | Facility: CLINIC | Age: 59
End: 2024-01-23
Payer: COMMERCIAL

## 2024-01-23 VITALS — HEIGHT: 71 IN | BODY MASS INDEX: 34.98 KG/M2 | TEMPERATURE: 97.8 F

## 2024-01-23 DIAGNOSIS — Z12.5 PROSTATE CANCER SCREENING: ICD-10-CM

## 2024-01-23 DIAGNOSIS — Z98.890 S/P SCROTAL VARICOCELECTOMY: Primary | ICD-10-CM

## 2024-01-23 DIAGNOSIS — Z86.79 S/P SCROTAL VARICOCELECTOMY: Primary | ICD-10-CM

## 2024-01-23 DIAGNOSIS — Z48.89 POSTOPERATIVE VISIT: ICD-10-CM

## 2024-01-23 PROCEDURE — 1036F TOBACCO NON-USER: CPT | Performed by: UROLOGY

## 2024-01-23 PROCEDURE — 99213 OFFICE O/P EST LOW 20 MIN: CPT | Performed by: UROLOGY

## 2024-01-23 ASSESSMENT — PAIN SCALES - GENERAL: PAINLEVEL: 0-NO PAIN

## 2024-01-23 NOTE — PROGRESS NOTES
HPI  58 year old male who presents today as a new patient.     Last Visit 8/1/23:  -scrotal pain counseling  -patient elects: Left varicocele/spermatic cord denervation.     Todays Visit:   #Varicocele   -s/p Left Varicocelectomy; Spermatic Cord Denervation; Left Vasectomy; Left Cord Lipoma Removal 10/5/23  -feels like he is healing well, no pain, some tenderness  -denies n/v/f/c  -overall doing well    MRI Prostate 07/27/23: Personally reviewed and interpreted.   IMPRESSION: 1. No MR evidence of clinically significant prostatic neoplasm.  2. BPH changes of the transition zone. Diffuse non nodular hypointensities within the peripheral zone, without evidence of focally restricted diffusion ( PI-RADS 2).  PI-RADS 2 - Low (clinically significant cancer is unlikely to be present)     Scrotal US 06/06/23: Personally reviewed and interpreted.   IMPRESSION: No evidence of testicular torsion or epididymo-orchitis. Left-sided varicocele.     Component      Latest Ref Rng 12/14/2020 1/18/2022   PSA      0.00 - 4.00 ng/mL 1.10  1.31        Current Medications:  Current Outpatient Medications   Medication Sig Dispense Refill    albuterol 90 mcg/actuation inhaler Inhale 2 puffs every 6 hours if needed for wheezing. 18 g 3    amLODIPine (Norvasc) 5 mg tablet Take 1 tablet (5 mg) by mouth once daily.      EPINEPHrine 1 mg/10mL syringe EPINEPHrine 1 MG/10ML Injection Solution Prefilled Syringe  Refills: 0      esomeprazole (NexIUM) 40 mg DR capsule TAKE ONE CAPSULE (40 MG) BY MOUTH ONCE EVERY DAY 90 capsule 3    fluticasone (Flonase) 50 mcg/actuation nasal spray Administer 2 sprays into each nostril once daily. 1 hour before bedtime.      losartan-hydrochlorothiazide (Hyzaar) 50-12.5 mg tablet TAKE ONE TABLET BY MOUTH DAILY AS DIRECTED 90 tablet 3    metFORMIN XR (Glucophage-XR) 500 mg 24 hr tablet Take 2 tablets (1,000 mg) by mouth once daily with a meal. Do not crush, chew, or split. 60 tablet 11    metoprolol succinate XL  (Toprol XL) 50 mg 24 hr tablet Take by mouth.      psyllium (Metamucil) 0.4 gram capsule Take 1 capsule by mouth.      rosuvastatin (Crestor) 40 mg tablet Take 1 tablet (40 mg) by mouth once daily.      sodium,potassium,mag sulfates (Suprep) 17.5-3.13-1.6 gram recon soln solution Take by mouth.      valACYclovir (Valtrex) 1 gram tablet Take 2 tablets (2,000 mg) by mouth. Repeat in 12 hours, as needed for mouth sores      valACYclovir (Valtrex) 500 mg tablet Take 1 tablet (500 mg) by mouth.       No current facility-administered medications for this visit.        PMH:  Past Medical History:   Diagnosis Date    Waterman's esophagus without dysplasia     Waterman's esophagus    Mild intermittent asthma, uncomplicated 11/18/2020    Reactive airway disease, mild intermittent, uncomplicated    Nasal congestion 05/22/2023    Obesity (BMI 30-39.9) 05/22/2023    Personal history of other diseases of the digestive system     History of hemorrhoids    Spondylosis without myelopathy or radiculopathy, cervical region 02/05/2014    Cervical spondylosis    Unspecified abdominal hernia without obstruction or gangrene     Hernia       PSH:  Past Surgical History:   Procedure Laterality Date    NECK SURGERY  08/27/2013    Neck Surgery    OTHER SURGICAL HISTORY  01/19/2021    Colonoscopy    OTHER SURGICAL HISTORY  01/19/2021    Endoscopy    OTHER SURGICAL HISTORY  01/19/2021    Vertebral fusion    VASECTOMY  07/17/2013    Surgery Vas Deferens Vasectomy       FMH:  Family History   Problem Relation Name Age of Onset    No Known Problems Mother      No Known Problems Father      Mental illness Sister      Prostate cancer Mother's Brother      Cancer Maternal Grandfather         SHx:  Social History     Tobacco Use    Smoking status: Former     Types: Cigarettes    Smokeless tobacco: Never   Substance Use Topics    Alcohol use: Yes       Allergies:  Allergies   Allergen Reactions    Animal Dander Other    Bee Venom Protein (Honey Bee)  Unknown    Cat Dander Unknown       Physical Exam   Testicles descended bilaterally, nontender, no masses  Vasa palpable bilaterally  Penis circ'd, no lesions, no plaques  Incisions healing well    Assessment/Plan  59 yo M with scrotal pain and left varicocele   -s/p Left Varicocelectomy; Spermatic Cord Denervation; Left Vasectomy; Left Cord Lipoma Removal 10/5/23  -healing well   -warning signs reviewed in detail    #Prostate ca screening   -PSA now      Scribe Attestation  By signing my name below, I, Nano Crouch-Krzysztof Longoria, attest that this documentation  has been prepared under the direction and in the presence of Judith Vicente MD.

## 2024-02-28 PROBLEM — Z86.010 HISTORY OF COLONIC POLYPS: Status: ACTIVE | Noted: 2024-02-28

## 2024-02-28 PROBLEM — Z86.0100 HISTORY OF COLONIC POLYPS: Status: ACTIVE | Noted: 2024-02-28

## 2024-02-28 PROBLEM — M25.649 STIFFNESS OF FINGER JOINT: Status: ACTIVE | Noted: 2019-01-28

## 2024-02-28 PROBLEM — K31.A11: Status: ACTIVE | Noted: 2024-02-28

## 2024-02-28 PROBLEM — R69 DISORDER: Status: ACTIVE | Noted: 2023-05-22

## 2024-02-28 RX ORDER — CIPROFLOXACIN 250 MG/1
TABLET, FILM COATED ORAL
COMMUNITY
Start: 2023-02-23

## 2024-02-29 ENCOUNTER — APPOINTMENT (OUTPATIENT)
Dept: PRIMARY CARE | Facility: CLINIC | Age: 59
End: 2024-02-29
Payer: COMMERCIAL

## 2024-03-12 ENCOUNTER — OFFICE VISIT (OUTPATIENT)
Dept: PRIMARY CARE | Facility: CLINIC | Age: 59
End: 2024-03-12
Payer: COMMERCIAL

## 2024-03-12 ENCOUNTER — TELEPHONE (OUTPATIENT)
Dept: PRIMARY CARE | Facility: CLINIC | Age: 59
End: 2024-03-12

## 2024-03-12 VITALS
HEART RATE: 60 BPM | HEIGHT: 72 IN | SYSTOLIC BLOOD PRESSURE: 140 MMHG | BODY MASS INDEX: 34.54 KG/M2 | TEMPERATURE: 96.7 F | OXYGEN SATURATION: 97 % | DIASTOLIC BLOOD PRESSURE: 90 MMHG | WEIGHT: 255 LBS

## 2024-03-12 DIAGNOSIS — E11.9 TYPE 2 DIABETES MELLITUS WITHOUT COMPLICATION, WITHOUT LONG-TERM CURRENT USE OF INSULIN (MULTI): ICD-10-CM

## 2024-03-12 DIAGNOSIS — Z23 ENCOUNTER FOR IMMUNIZATION: ICD-10-CM

## 2024-03-12 DIAGNOSIS — I10 BENIGN ESSENTIAL HYPERTENSION: ICD-10-CM

## 2024-03-12 DIAGNOSIS — B00.1 HERPES LABIALIS: Primary | ICD-10-CM

## 2024-03-12 DIAGNOSIS — E78.5 DYSLIPIDEMIA: ICD-10-CM

## 2024-03-12 DIAGNOSIS — I10 PRIMARY HYPERTENSION: ICD-10-CM

## 2024-03-12 PROBLEM — R73.03 PRE-DIABETES: Status: RESOLVED | Noted: 2023-05-25 | Resolved: 2024-03-12

## 2024-03-12 PROBLEM — N50.819 PERSISTENT PAIN IN TESTICLE: Status: RESOLVED | Noted: 2023-08-28 | Resolved: 2024-03-12

## 2024-03-12 PROCEDURE — 3080F DIAST BP >= 90 MM HG: CPT | Performed by: FAMILY MEDICINE

## 2024-03-12 PROCEDURE — 1036F TOBACCO NON-USER: CPT | Performed by: FAMILY MEDICINE

## 2024-03-12 PROCEDURE — 99214 OFFICE O/P EST MOD 30 MIN: CPT | Performed by: FAMILY MEDICINE

## 2024-03-12 PROCEDURE — 90750 HZV VACC RECOMBINANT IM: CPT | Performed by: FAMILY MEDICINE

## 2024-03-12 PROCEDURE — 3077F SYST BP >= 140 MM HG: CPT | Performed by: FAMILY MEDICINE

## 2024-03-12 PROCEDURE — 90471 IMMUNIZATION ADMIN: CPT | Performed by: FAMILY MEDICINE

## 2024-03-12 RX ORDER — VALACYCLOVIR HYDROCHLORIDE 1 G/1
TABLET, FILM COATED ORAL
Qty: 16 TABLET | Refills: 30 | Status: SHIPPED | OUTPATIENT
Start: 2024-03-12

## 2024-03-12 RX ORDER — VALACYCLOVIR HYDROCHLORIDE 1 G/1
TABLET, FILM COATED ORAL
Qty: 16 TABLET | Refills: 0 | Status: SHIPPED | OUTPATIENT
Start: 2024-03-12 | End: 2024-03-12 | Stop reason: SDUPTHER

## 2024-03-12 ASSESSMENT — ENCOUNTER SYMPTOMS
DIZZINESS: 0
DECREASED CONCENTRATION: 0
HEADACHES: 0
DIARRHEA: 0
JOINT SWELLING: 0
CONFUSION: 0
ABDOMINAL PAIN: 0
VOMITING: 0
FATIGUE: 0
FEVER: 0
PALPITATIONS: 0
NAUSEA: 0
NUMBNESS: 0
DYSURIA: 0
SHORTNESS OF BREATH: 0
UNEXPECTED WEIGHT CHANGE: 0
HALLUCINATIONS: 0
EYE PAIN: 0
COUGH: 0
SORE THROAT: 0
BLOOD IN STOOL: 0
HEMATURIA: 0
FREQUENCY: 0
TROUBLE SWALLOWING: 0
WEAKNESS: 0

## 2024-03-12 ASSESSMENT — PAIN SCALES - GENERAL: PAINLEVEL: 0-NO PAIN

## 2024-03-12 NOTE — TELEPHONE ENCOUNTER
Calling for clarification on directions. Repeat in 12 hrs? Is he taking 1 pill after 12 hrs? Requested new script with clarified instructions

## 2024-03-12 NOTE — PATIENT INSTRUCTIONS
It was nice to see you today!  Discussed current concerns and addressed   Reviewed recent labs and diagnostics  Reviewed medications list  Continue to eat a healthy diet, exercise at least 3 times a week or more  Plan and follow up discussed  For any further information related to your condition, copy and paste or go to [email or web address]  First Shingrix given today, follow-up 2 months for another 1.  Discussed diet, exercise and cutting calories out.  He is going to try an oral appliance for sleep apnea as he does not like CPAP.  He will start being more physically active.  Recheck labs when due.

## 2024-03-12 NOTE — PROGRESS NOTES
Subjective   Patient ID: Woodrow Rodriguez is a 58 y.o. male.    Patient here for follow-up.  He is overweight with a BMI of 34.58.  He has type 2 diabetes though his A1c is been no higher than 6%.  He is on metformin.  Insurance would not cover Ozempic.  History of asthma but is doing well.  Blood pressure is on the high end of normal but typically at goal.  He has a history of Waterman's esophagus and takes medication.  Reviewed his list.  He would like to get his shingles vaccine and needs to refill on antivirals for herpes simplex 1.  He will be due for labs soon.        Review of Systems   Constitutional:  Negative for fatigue, fever and unexpected weight change.   HENT:  Negative for congestion, ear pain, hearing loss, sore throat and trouble swallowing.    Eyes:  Negative for pain and visual disturbance.   Respiratory:  Negative for cough and shortness of breath.    Cardiovascular:  Negative for chest pain, palpitations and leg swelling.   Gastrointestinal:  Negative for abdominal pain, blood in stool, diarrhea, nausea and vomiting.   Genitourinary:  Negative for dysuria, frequency, hematuria and urgency.   Musculoskeletal:  Negative for joint swelling.   Skin:  Negative for pallor and rash.   Neurological:  Negative for dizziness, syncope, weakness, numbness and headaches.   Psychiatric/Behavioral:  Negative for confusion, decreased concentration, hallucinations and suicidal ideas.      Vitals:    03/12/24 1052   BP: 140/90   Pulse: 60   Temp: 35.9 °C (96.7 °F)   SpO2: 97%      Objective   Physical Exam  Constitutional:       Appearance: Normal appearance. He is not toxic-appearing.   HENT:      Head: Normocephalic and atraumatic.      Right Ear: Tympanic membrane normal.      Left Ear: Tympanic membrane normal.      Nose: Nose normal.      Mouth/Throat:      Mouth: Mucous membranes are moist.      Pharynx: Oropharynx is clear. No oropharyngeal exudate or posterior oropharyngeal erythema.   Eyes:       General: No scleral icterus.     Extraocular Movements: Extraocular movements intact.      Conjunctiva/sclera: Conjunctivae normal.      Pupils: Pupils are equal, round, and reactive to light.   Cardiovascular:      Rate and Rhythm: Normal rate and regular rhythm.      Pulses: Normal pulses.   Pulmonary:      Effort: No respiratory distress.      Breath sounds: Normal breath sounds. No wheezing or rhonchi.   Abdominal:      General: Abdomen is flat. Bowel sounds are normal.      Palpations: Abdomen is soft. There is no mass.      Tenderness: There is no abdominal tenderness. There is no guarding.   Musculoskeletal:         General: Normal range of motion.      Cervical back: No rigidity or tenderness.   Lymphadenopathy:      Cervical: No cervical adenopathy.   Skin:     General: Skin is warm and dry.      Findings: No rash.   Neurological:      General: No focal deficit present.      Mental Status: He is alert and oriented to person, place, and time.      Cranial Nerves: No cranial nerve deficit.      Motor: No weakness.   Psychiatric:         Mood and Affect: Mood normal.         Behavior: Behavior normal.         Thought Content: Thought content normal.         Assessment/Plan   Diagnoses and all orders for this visit:  Herpes labialis  -     valACYclovir (Valtrex) 1 gram tablet; Repeat in 12 hours, as needed for mouth sores  Type 2 diabetes mellitus without complication, without long-term current use of insulin (CMS/[facility])  -     CBC and Auto Differential; Future  -     Comprehensive Metabolic Panel; Future  -     Hemoglobin A1C; Future  -     Lipid Panel; Future  -     Vitamin D 25-Hydroxy,Total (for eval of Vitamin D levels); Future  -     Urinalysis with Reflex Microscopic; Future  -     Thyroid Stimulating Hormone; Future  -     Prostate Specific Antigen, Screen; Future  Encounter for immunization  -     Zoster vaccine, recombinant, adult (SHINGRIX)  Benign essential hypertension  -     CBC and Auto  Differential; Future  -     Comprehensive Metabolic Panel; Future  -     Hemoglobin A1C; Future  -     Lipid Panel; Future  -     Vitamin D 25-Hydroxy,Total (for eval of Vitamin D levels); Future  -     Urinalysis with Reflex Microscopic; Future  -     Thyroid Stimulating Hormone; Future  -     Prostate Specific Antigen, Screen; Future  Dyslipidemia  -     CBC and Auto Differential; Future  -     Comprehensive Metabolic Panel; Future  -     Hemoglobin A1C; Future  -     Lipid Panel; Future  -     Vitamin D 25-Hydroxy,Total (for eval of Vitamin D levels); Future  -     Urinalysis with Reflex Microscopic; Future  -     Thyroid Stimulating Hormone; Future  -     Prostate Specific Antigen, Screen; Future  Primary hypertension

## 2024-03-18 DIAGNOSIS — E78.5 DYSLIPIDEMIA: Primary | ICD-10-CM

## 2024-03-19 RX ORDER — ROSUVASTATIN CALCIUM 40 MG/1
40 TABLET, COATED ORAL DAILY
Qty: 90 TABLET | Refills: 3 | Status: SHIPPED | OUTPATIENT
Start: 2024-03-19

## 2024-05-13 ENCOUNTER — APPOINTMENT (OUTPATIENT)
Dept: PRIMARY CARE | Facility: CLINIC | Age: 59
End: 2024-05-13
Payer: COMMERCIAL

## 2024-05-20 ENCOUNTER — CLINICAL SUPPORT (OUTPATIENT)
Dept: PRIMARY CARE | Facility: CLINIC | Age: 59
End: 2024-05-20
Payer: COMMERCIAL

## 2024-05-20 DIAGNOSIS — Z23 NEED FOR SHINGLES VACCINE: ICD-10-CM

## 2024-05-20 PROCEDURE — 90750 HZV VACC RECOMBINANT IM: CPT | Performed by: FAMILY MEDICINE

## 2024-05-20 PROCEDURE — 90471 IMMUNIZATION ADMIN: CPT | Performed by: FAMILY MEDICINE

## 2024-06-26 DIAGNOSIS — K22.719 BARRETT'S ESOPHAGUS WITH DYSPLASIA: ICD-10-CM

## 2024-06-26 RX ORDER — ESOMEPRAZOLE MAGNESIUM 40 MG/1
40 CAPSULE, DELAYED RELEASE ORAL DAILY
Qty: 90 CAPSULE | Refills: 0 | Status: SHIPPED | OUTPATIENT
Start: 2024-06-26

## 2024-07-21 DIAGNOSIS — R73.03 PRE-DIABETES: ICD-10-CM

## 2024-07-22 RX ORDER — METFORMIN HYDROCHLORIDE 500 MG/1
TABLET, EXTENDED RELEASE ORAL
Qty: 60 TABLET | Refills: 0 | Status: SHIPPED | OUTPATIENT
Start: 2024-07-22

## 2024-08-29 DIAGNOSIS — R73.03 PRE-DIABETES: ICD-10-CM

## 2024-08-29 RX ORDER — METFORMIN HYDROCHLORIDE 500 MG/1
1000 TABLET, EXTENDED RELEASE ORAL
Qty: 180 TABLET | Refills: 3 | Status: SHIPPED | OUTPATIENT
Start: 2024-08-29

## 2024-09-14 ENCOUNTER — LAB (OUTPATIENT)
Dept: LAB | Facility: LAB | Age: 59
End: 2024-09-14
Payer: COMMERCIAL

## 2024-09-16 ENCOUNTER — LAB (OUTPATIENT)
Dept: LAB | Facility: LAB | Age: 59
End: 2024-09-16
Payer: COMMERCIAL

## 2024-09-16 DIAGNOSIS — I10 BENIGN ESSENTIAL HYPERTENSION: ICD-10-CM

## 2024-09-16 DIAGNOSIS — E11.9 TYPE 2 DIABETES MELLITUS WITHOUT COMPLICATION, WITHOUT LONG-TERM CURRENT USE OF INSULIN (MULTI): ICD-10-CM

## 2024-09-16 DIAGNOSIS — E78.5 DYSLIPIDEMIA: ICD-10-CM

## 2024-09-16 PROBLEM — E66.9 OBESITY WITH BODY MASS INDEX 30 OR GREATER: Status: ACTIVE | Noted: 2024-09-16

## 2024-09-16 PROBLEM — K64.9 HEMORRHOIDS: Status: ACTIVE | Noted: 2024-09-16

## 2024-09-16 LAB
25(OH)D3 SERPL-MCNC: 37 NG/ML (ref 30–100)
ALBUMIN SERPL BCP-MCNC: 4 G/DL (ref 3.4–5)
ALP SERPL-CCNC: 99 U/L (ref 33–120)
ALT SERPL W P-5'-P-CCNC: 33 U/L (ref 10–52)
ANION GAP SERPL CALC-SCNC: 11 MMOL/L (ref 10–20)
APPEARANCE UR: CLEAR
AST SERPL W P-5'-P-CCNC: 19 U/L (ref 9–39)
BASOPHILS # BLD AUTO: 0.03 X10*3/UL (ref 0–0.1)
BASOPHILS NFR BLD AUTO: 0.5 %
BILIRUB SERPL-MCNC: 0.8 MG/DL (ref 0–1.2)
BILIRUB UR STRIP.AUTO-MCNC: NEGATIVE MG/DL
BUN SERPL-MCNC: 19 MG/DL (ref 6–23)
CALCIUM SERPL-MCNC: 9 MG/DL (ref 8.6–10.3)
CHLORIDE SERPL-SCNC: 101 MMOL/L (ref 98–107)
CHOLEST SERPL-MCNC: 180 MG/DL (ref 0–199)
CHOLESTEROL/HDL RATIO: 4
CO2 SERPL-SCNC: 30 MMOL/L (ref 21–32)
COLOR UR: YELLOW
CREAT SERPL-MCNC: 0.95 MG/DL (ref 0.5–1.3)
EGFRCR SERPLBLD CKD-EPI 2021: >90 ML/MIN/1.73M*2
EOSINOPHIL # BLD AUTO: 0.18 X10*3/UL (ref 0–0.7)
EOSINOPHIL NFR BLD AUTO: 3 %
ERYTHROCYTE [DISTWIDTH] IN BLOOD BY AUTOMATED COUNT: 13.2 % (ref 11.5–14.5)
EST. AVERAGE GLUCOSE BLD GHB EST-MCNC: 126 MG/DL
GLUCOSE SERPL-MCNC: 99 MG/DL (ref 74–99)
GLUCOSE UR STRIP.AUTO-MCNC: NORMAL MG/DL
HBA1C MFR BLD: 6 %
HCT VFR BLD AUTO: 45.8 % (ref 41–52)
HDLC SERPL-MCNC: 44.9 MG/DL
HGB BLD-MCNC: 14.6 G/DL (ref 13.5–17.5)
IMM GRANULOCYTES # BLD AUTO: 0.02 X10*3/UL (ref 0–0.7)
IMM GRANULOCYTES NFR BLD AUTO: 0.3 % (ref 0–0.9)
KETONES UR STRIP.AUTO-MCNC: NEGATIVE MG/DL
LDLC SERPL CALC-MCNC: 104 MG/DL
LEUKOCYTE ESTERASE UR QL STRIP.AUTO: NEGATIVE
LYMPHOCYTES # BLD AUTO: 1.82 X10*3/UL (ref 1.2–4.8)
LYMPHOCYTES NFR BLD AUTO: 30 %
MCH RBC QN AUTO: 28.7 PG (ref 26–34)
MCHC RBC AUTO-ENTMCNC: 31.9 G/DL (ref 32–36)
MCV RBC AUTO: 90 FL (ref 80–100)
MONOCYTES # BLD AUTO: 0.61 X10*3/UL (ref 0.1–1)
MONOCYTES NFR BLD AUTO: 10.1 %
MUCOUS THREADS #/AREA URNS AUTO: ABNORMAL /LPF
NEUTROPHILS # BLD AUTO: 3.4 X10*3/UL (ref 1.2–7.7)
NEUTROPHILS NFR BLD AUTO: 56.1 %
NITRITE UR QL STRIP.AUTO: NEGATIVE
NON HDL CHOLESTEROL: 135 MG/DL (ref 0–149)
NRBC BLD-RTO: 0 /100 WBCS (ref 0–0)
PH UR STRIP.AUTO: 6 [PH]
PLATELET # BLD AUTO: 328 X10*3/UL (ref 150–450)
POTASSIUM SERPL-SCNC: 3.9 MMOL/L (ref 3.5–5.3)
PROT SERPL-MCNC: 7 G/DL (ref 6.4–8.2)
PROT UR STRIP.AUTO-MCNC: NEGATIVE MG/DL
PSA SERPL-MCNC: 1.12 NG/ML
RBC # BLD AUTO: 5.08 X10*6/UL (ref 4.5–5.9)
RBC # UR STRIP.AUTO: ABNORMAL /UL
RBC #/AREA URNS AUTO: ABNORMAL /HPF
SODIUM SERPL-SCNC: 138 MMOL/L (ref 136–145)
SP GR UR STRIP.AUTO: 1.03
TRIGL SERPL-MCNC: 157 MG/DL (ref 0–149)
TSH SERPL-ACNC: 1.36 MIU/L (ref 0.44–3.98)
UROBILINOGEN UR STRIP.AUTO-MCNC: NORMAL MG/DL
VLDL: 31 MG/DL (ref 0–40)
WBC # BLD AUTO: 6.1 X10*3/UL (ref 4.4–11.3)
WBC #/AREA URNS AUTO: ABNORMAL /HPF
WBC CLUMPS #/AREA URNS AUTO: ABNORMAL /HPF

## 2024-09-16 PROCEDURE — 82306 VITAMIN D 25 HYDROXY: CPT

## 2024-09-16 PROCEDURE — 81001 URINALYSIS AUTO W/SCOPE: CPT

## 2024-09-16 PROCEDURE — 80053 COMPREHEN METABOLIC PANEL: CPT

## 2024-09-16 PROCEDURE — 85025 COMPLETE CBC W/AUTO DIFF WBC: CPT

## 2024-09-16 PROCEDURE — 83036 HEMOGLOBIN GLYCOSYLATED A1C: CPT

## 2024-09-16 PROCEDURE — 84153 ASSAY OF PSA TOTAL: CPT

## 2024-09-16 PROCEDURE — 36415 COLL VENOUS BLD VENIPUNCTURE: CPT

## 2024-09-16 PROCEDURE — 84443 ASSAY THYROID STIM HORMONE: CPT

## 2024-09-16 PROCEDURE — 80061 LIPID PANEL: CPT

## 2024-09-17 ENCOUNTER — OFFICE VISIT (OUTPATIENT)
Dept: UROLOGY | Facility: CLINIC | Age: 59
End: 2024-09-17
Payer: COMMERCIAL

## 2024-09-17 ENCOUNTER — APPOINTMENT (OUTPATIENT)
Dept: PRIMARY CARE | Facility: CLINIC | Age: 59
End: 2024-09-17
Payer: COMMERCIAL

## 2024-09-17 VITALS
SYSTOLIC BLOOD PRESSURE: 130 MMHG | BODY MASS INDEX: 35.84 KG/M2 | DIASTOLIC BLOOD PRESSURE: 80 MMHG | HEART RATE: 84 BPM | TEMPERATURE: 98.2 F | OXYGEN SATURATION: 97 % | WEIGHT: 256 LBS | HEIGHT: 71 IN

## 2024-09-17 VITALS — TEMPERATURE: 98 F

## 2024-09-17 DIAGNOSIS — E78.5 DYSLIPIDEMIA: ICD-10-CM

## 2024-09-17 DIAGNOSIS — I10 BENIGN ESSENTIAL HYPERTENSION: Primary | ICD-10-CM

## 2024-09-17 DIAGNOSIS — R97.20 PSA ELEVATION: ICD-10-CM

## 2024-09-17 DIAGNOSIS — R31.9 HEMATURIA, UNSPECIFIED TYPE: ICD-10-CM

## 2024-09-17 DIAGNOSIS — N13.8 BPH WITH OBSTRUCTION/LOWER URINARY TRACT SYMPTOMS: ICD-10-CM

## 2024-09-17 DIAGNOSIS — N40.1 BPH WITH OBSTRUCTION/LOWER URINARY TRACT SYMPTOMS: ICD-10-CM

## 2024-09-17 DIAGNOSIS — R31.29 OTHER MICROSCOPIC HEMATURIA: ICD-10-CM

## 2024-09-17 PROCEDURE — 3075F SYST BP GE 130 - 139MM HG: CPT | Performed by: FAMILY MEDICINE

## 2024-09-17 PROCEDURE — 99396 PREV VISIT EST AGE 40-64: CPT | Performed by: FAMILY MEDICINE

## 2024-09-17 PROCEDURE — 3008F BODY MASS INDEX DOCD: CPT | Performed by: FAMILY MEDICINE

## 2024-09-17 PROCEDURE — 51798 US URINE CAPACITY MEASURE: CPT | Performed by: UROLOGY

## 2024-09-17 PROCEDURE — 81001 URINALYSIS AUTO W/SCOPE: CPT

## 2024-09-17 PROCEDURE — 51741 ELECTRO-UROFLOWMETRY FIRST: CPT | Performed by: UROLOGY

## 2024-09-17 PROCEDURE — 1036F TOBACCO NON-USER: CPT | Performed by: UROLOGY

## 2024-09-17 PROCEDURE — 1036F TOBACCO NON-USER: CPT | Performed by: FAMILY MEDICINE

## 2024-09-17 PROCEDURE — 99214 OFFICE O/P EST MOD 30 MIN: CPT | Performed by: UROLOGY

## 2024-09-17 PROCEDURE — 3044F HG A1C LEVEL LT 7.0%: CPT | Performed by: UROLOGY

## 2024-09-17 PROCEDURE — 3079F DIAST BP 80-89 MM HG: CPT | Performed by: FAMILY MEDICINE

## 2024-09-17 PROCEDURE — 3049F LDL-C 100-129 MG/DL: CPT | Performed by: FAMILY MEDICINE

## 2024-09-17 PROCEDURE — 3044F HG A1C LEVEL LT 7.0%: CPT | Performed by: FAMILY MEDICINE

## 2024-09-17 PROCEDURE — 3049F LDL-C 100-129 MG/DL: CPT | Performed by: UROLOGY

## 2024-09-17 ASSESSMENT — ENCOUNTER SYMPTOMS
HALLUCINATIONS: 0
BLOOD IN STOOL: 0
EYE PAIN: 0
PALPITATIONS: 0
DECREASED CONCENTRATION: 0
HEMATURIA: 0
FATIGUE: 0
VOMITING: 0
COUGH: 0
HEADACHES: 0
NAUSEA: 0
JOINT SWELLING: 0
WEAKNESS: 0
SHORTNESS OF BREATH: 0
ABDOMINAL PAIN: 0
SORE THROAT: 0
DIARRHEA: 0
UNEXPECTED WEIGHT CHANGE: 0
DYSURIA: 0
FREQUENCY: 0
CONFUSION: 0
NUMBNESS: 0
TROUBLE SWALLOWING: 0
DIZZINESS: 0
FEVER: 0

## 2024-09-17 ASSESSMENT — PAIN SCALES - GENERAL
PAINLEVEL: 0-NO PAIN
PAINLEVEL: 0-NO PAIN

## 2024-09-17 NOTE — PROGRESS NOTES
Subjective   Patient ID: Woodrow Rodriguez is a 59 y.o. male.    Woodrow Rodriguez comes in today for physical exam.  There has been no chest pain, shortness of breath, fever, chills, unexplained weight loss, rectal bleeding or any other unusual symptoms.  He is overweight/obese with a BMI of 35.45.  He is on low-dose metformin and rosuvastatin.  He also takes blood pressure medicine and it is at goal.  Recent labs, diagnostics and pertinent information has been reviewed. Patient is attempting to eat a healthy diet and incorporate exercise in to their lifestyle. Patient does not smoke. Alcohol in moderation. Patient is practicing routine eye and dental care. Reviewed employment status. No uncontrolled anxiety, depression. Reviewed current medications, if any.          Review of Systems   Constitutional:  Negative for fatigue, fever and unexpected weight change.   HENT:  Negative for congestion, ear pain, hearing loss, sore throat and trouble swallowing.    Eyes:  Negative for pain and visual disturbance.   Respiratory:  Negative for cough and shortness of breath.    Cardiovascular:  Negative for chest pain, palpitations and leg swelling.   Gastrointestinal:  Negative for abdominal pain, blood in stool, diarrhea, nausea and vomiting.   Genitourinary:  Negative for dysuria, frequency, hematuria and urgency.   Musculoskeletal:  Negative for joint swelling.   Skin:  Negative for pallor and rash.   Neurological:  Negative for dizziness, syncope, weakness, numbness and headaches.   Psychiatric/Behavioral:  Negative for confusion, decreased concentration, hallucinations and suicidal ideas.      Vitals:    09/17/24 0859   BP: 130/80   Pulse: 84   Temp: 36.8 °C (98.2 °F)   SpO2: 97%      Objective   Physical Exam  Constitutional:       Appearance: Normal appearance. He is not toxic-appearing.   HENT:      Head: Normocephalic and atraumatic.      Right Ear: Tympanic membrane normal.      Left Ear: Tympanic membrane normal.       Nose: Nose normal.      Mouth/Throat:      Mouth: Mucous membranes are moist.      Pharynx: Oropharynx is clear. No oropharyngeal exudate or posterior oropharyngeal erythema.   Eyes:      General: No scleral icterus.     Extraocular Movements: Extraocular movements intact.      Conjunctiva/sclera: Conjunctivae normal.      Pupils: Pupils are equal, round, and reactive to light.   Cardiovascular:      Rate and Rhythm: Normal rate and regular rhythm.      Pulses: Normal pulses.   Pulmonary:      Effort: No respiratory distress.      Breath sounds: Normal breath sounds. No wheezing or rhonchi.   Abdominal:      General: Abdomen is flat. Bowel sounds are normal.      Palpations: Abdomen is soft. There is no mass.      Tenderness: There is no abdominal tenderness. There is no guarding.   Musculoskeletal:         General: Normal range of motion.      Cervical back: No rigidity or tenderness.   Lymphadenopathy:      Cervical: No cervical adenopathy.   Skin:     General: Skin is warm and dry.      Findings: No rash.   Neurological:      General: No focal deficit present.      Mental Status: He is alert and oriented to person, place, and time.      Cranial Nerves: No cranial nerve deficit.      Motor: No weakness.   Psychiatric:         Mood and Affect: Mood normal.         Behavior: Behavior normal.         Thought Content: Thought content normal.         Assessment/Plan   There are no diagnoses linked to this encounter.

## 2024-09-17 NOTE — PROGRESS NOTES
Subjective   Woodrow Rodriguez is a 59 y.o. male with family history of prostate cancer and history of left varicocele. Patient presents today for annual follow up visit.     Patient reports occasional urinary frequency and urgency and nocturia x1 which are not bothersome. He was told by his PCP that he has microscopic hematuria. He denies any gross hematuria. Microscopic UA from 9/16/2024 showed 6-10 RBC's. He states he had intercourse prior to urinalysis Denies fevers, chills, urinary retention, intractable flank or abdominal pain, nausea or vomiting.    Past Medical History:   Diagnosis Date    Waterman's esophagus without dysplasia     Waterman's esophagus    Mild intermittent asthma, uncomplicated ([facility]) 11/18/2020    Reactive airway disease, mild intermittent, uncomplicated    Nasal congestion 05/22/2023    Obesity (BMI 30-39.9) 05/22/2023    Personal history of other diseases of the digestive system     History of hemorrhoids    Spondylosis without myelopathy or radiculopathy, cervical region 02/05/2014    Cervical spondylosis    Unspecified abdominal hernia without obstruction or gangrene     Hernia     Past Surgical History:   Procedure Laterality Date    NECK SURGERY  08/27/2013    Neck Surgery    OTHER SURGICAL HISTORY  01/19/2021    Colonoscopy    OTHER SURGICAL HISTORY  01/19/2021    Endoscopy    OTHER SURGICAL HISTORY  01/19/2021    Vertebral fusion    VASECTOMY  07/17/2013    Surgery Vas Deferens Vasectomy     Family History   Problem Relation Name Age of Onset    No Known Problems Mother      No Known Problems Father      Mental illness Sister      Prostate cancer Mother's Brother      Cancer Maternal Grandfather       Current Outpatient Medications   Medication Sig Dispense Refill    EPINEPHrine 1 mg/10mL syringe EPINEPHrine 1 MG/10ML Injection Solution Prefilled Syringe  Refills: 0      losartan-hydrochlorothiazide (Hyzaar) 50-12.5 mg tablet TAKE ONE TABLET BY MOUTH DAILY AS DIRECTED 90 tablet 3     metFORMIN  mg 24 hr tablet Take 2 tablets (1,000 mg) by mouth once daily in the evening. Take with meals. Do not crush, chew, or split. 180 tablet 3    psyllium (Metamucil) 0.4 gram capsule Take 1 capsule by mouth.      rosuvastatin (Crestor) 40 mg tablet TAKE ONE TABLET BY MOUTH DAILY 90 tablet 3    valACYclovir (Valtrex) 1 gram tablet Take 2 pills at on set of cold sore. Repeat dose in 12 hours for a total of 4 pills per episode. 16 tablet 30     No current facility-administered medications for this visit.     Allergies   Allergen Reactions    Animal Dander Other    Bee Venom Protein (Honey Bee) Unknown    Cat Dander Unknown     Social History     Socioeconomic History    Marital status:      Spouse name: Not on file    Number of children: Not on file    Years of education: Not on file    Highest education level: Not on file   Occupational History    Not on file   Tobacco Use    Smoking status: Former     Types: Cigarettes    Smokeless tobacco: Never   Substance and Sexual Activity    Alcohol use: Yes    Drug use: Not on file    Sexual activity: Not on file   Other Topics Concern    Not on file   Social History Narrative    Not on file     Social Determinants of Health     Financial Resource Strain: Not on file   Food Insecurity: Not on file   Transportation Needs: Not on file   Physical Activity: Not on file   Stress: Not on file   Social Connections: Not on file   Intimate Partner Violence: Not on file   Housing Stability: Not on file       Review of Systems  Pertinent items are noted in HPI.    Objective       Lab Review  Lab Results   Component Value Date    WBC 6.1 09/16/2024    RBC 5.08 09/16/2024    HGB 14.6 09/16/2024    HCT 45.8 09/16/2024     09/16/2024      Lab Results   Component Value Date    BUN 19 09/16/2024    CREATININE 0.95 09/16/2024        Lab Results   Component Value Date    PSA 1.31 01/18/2022    PSA 1.10 12/14/2020   IPSS 3 and 0  PVR 14ml   Uroflow study demonstrated  voided volume of 143 and maximal flow rate of 12 ml/s.       Assessment/Plan   There are no diagnoses linked to this encounter.    Family history of prostate cancer    PSA is 1.12 (9/16/2024), stable.     Patient has no bothersome urinary symptoms.     We will follow up annually with PSA prior.     2. Microscopic hematuria     We will repeat microscopic UA and follow up virtually to review.     All questions were answered to the patient's satisfaction. Patient agrees with the plan and wishes to proceed. Follow-up will be scheduled appropriately.       Scribed for Dr. Vu by Haydee Stapleton. I , Dr Vu, have personally reviewed and agreed with the information entered by the Virtual Scribe.

## 2024-09-18 LAB
MUCOUS THREADS #/AREA URNS AUTO: NORMAL /LPF
RBC #/AREA URNS AUTO: NORMAL /HPF
WBC #/AREA URNS AUTO: NORMAL /HPF

## 2024-09-20 ENCOUNTER — TELEMEDICINE (OUTPATIENT)
Dept: UROLOGY | Facility: CLINIC | Age: 59
End: 2024-09-20
Payer: COMMERCIAL

## 2024-09-20 DIAGNOSIS — R31.9 HEMATURIA, UNSPECIFIED TYPE: Primary | ICD-10-CM

## 2024-09-20 DIAGNOSIS — N40.1 BPH WITH OBSTRUCTION/LOWER URINARY TRACT SYMPTOMS: ICD-10-CM

## 2024-09-20 DIAGNOSIS — N13.8 BPH WITH OBSTRUCTION/LOWER URINARY TRACT SYMPTOMS: ICD-10-CM

## 2024-09-20 PROCEDURE — 3049F LDL-C 100-129 MG/DL: CPT | Performed by: UROLOGY

## 2024-09-20 PROCEDURE — 3044F HG A1C LEVEL LT 7.0%: CPT | Performed by: UROLOGY

## 2024-09-20 PROCEDURE — 99213 OFFICE O/P EST LOW 20 MIN: CPT | Performed by: UROLOGY

## 2024-09-20 NOTE — PROGRESS NOTES
Scribed for Dr. Anushka Vu by Armando Hernandez.  I, Dr. Anushka Vu, have personally reviewed and agreed with the information entered by the Virtual Scribe. 09/20/24    This visit was completed via telemedicine. All issues as below were discussed and addressed but no physical exam was performed unless allowed by visual confirmation. If it was felt that the patient should be evaluated in clinic, then they were directed there. Patient verbal consented to the visit.    History of Present Illness:  TODAY: (09/20/24)  Woodrow Rodriguez is a 59 y.o. male with history of L varicocele, and microscopic hematuria. FH of prostate cancer. Presents virtually for follow up and review lab results.     Presents virtually for a follow up visit.   Microscopic UA showed 6-10 RBC's. (09/16)  He states he had intercourse prior to urinalysis.   Repeat microanalysis only showed 1-2 RBC's. (09/17)  Provided reassurance, no further hematuria work-up indicated at this time.     TO REVIEW:   family history of prostate cancer and history of left varicocele.   Patient presents today for annual follow up visit.     Patient reports occasional urinary frequency and urgency and nocturia x1 which are not bothersome. He was told by his PCP that he has microscopic hematuria. He denies any gross hematuria. Denies fevers, chills, urinary retention, intractable flank or abdominal pain, nausea or vomiting.        Past Medical History:   Diagnosis Date    Waterman's esophagus without dysplasia     Waterman's esophagus    Mild intermittent asthma, uncomplicated ([facility]) 11/18/2020    Reactive airway disease, mild intermittent, uncomplicated    Nasal congestion 05/22/2023    Obesity (BMI 30-39.9) 05/22/2023    Personal history of other diseases of the digestive system     History of hemorrhoids    Spondylosis without myelopathy or radiculopathy, cervical region 02/05/2014    Cervical spondylosis    Unspecified abdominal hernia without obstruction or gangrene      Hernia     Past Surgical History:   Procedure Laterality Date    NECK SURGERY  08/27/2013    Neck Surgery    OTHER SURGICAL HISTORY  01/19/2021    Colonoscopy    OTHER SURGICAL HISTORY  01/19/2021    Endoscopy    OTHER SURGICAL HISTORY  01/19/2021    Vertebral fusion    VASECTOMY  07/17/2013    Surgery Vas Deferens Vasectomy     Family History   Problem Relation Name Age of Onset    No Known Problems Mother      No Known Problems Father      Mental illness Sister      Prostate cancer Mother's Brother      Cancer Maternal Grandfather       Social History     Tobacco Use   Smoking Status Former    Types: Cigarettes   Smokeless Tobacco Never     Current Outpatient Medications   Medication Sig Dispense Refill    EPINEPHrine 1 mg/10mL syringe EPINEPHrine 1 MG/10ML Injection Solution Prefilled Syringe  Refills: 0      losartan-hydrochlorothiazide (Hyzaar) 50-12.5 mg tablet TAKE ONE TABLET BY MOUTH DAILY AS DIRECTED 90 tablet 3    metFORMIN  mg 24 hr tablet Take 2 tablets (1,000 mg) by mouth once daily in the evening. Take with meals. Do not crush, chew, or split. 180 tablet 3    psyllium (Metamucil) 0.4 gram capsule Take 1 capsule by mouth.      rosuvastatin (Crestor) 40 mg tablet TAKE ONE TABLET BY MOUTH DAILY 90 tablet 3    valACYclovir (Valtrex) 1 gram tablet Take 2 pills at on set of cold sore. Repeat dose in 12 hours for a total of 4 pills per episode. 16 tablet 30     No current facility-administered medications for this visit.     Allergies   Allergen Reactions    Animal Dander Other    Bee Venom Protein (Honey Bee) Unknown    Cat Dander Unknown     Past medical, surgical, family and social history in the chart was reviewed and is accurate including any additions to what is in this HPI.    Review of systems:   Pertinent information as listed in the HPI.        Objective   There were no vitals taken for this visit.  Physical Exam:  Exam limited 2/2 being a telehealth visit.     Lab Review:  Lab Results    Component Value Date    WBC 6.1 09/16/2024    RBC 5.08 09/16/2024    HGB 14.6 09/16/2024    HCT 45.8 09/16/2024     09/16/2024      Lab Results   Component Value Date    BUN 19 09/16/2024    CREATININE 0.95 09/16/2024      Lab Results   Component Value Date    PSA 1.31 01/18/2022    HGBA1C 6.0 (H) 09/16/2024    HGBA1C 5.9 08/31/2023     Lab Results   Component Value Date    CHOL 180 09/16/2024    TRIG 157 (H) 09/16/2024    HDL 44.9 09/16/2024    ALT 33 09/16/2024    AST 19 09/16/2024     09/16/2024    K 3.9 09/16/2024     09/16/2024    CO2 30 09/16/2024    TSH 1.36 09/16/2024        ASSESSMENT:  Problem List Items Addressed This Visit    None     PLAN:  #PSA screening  #Family history of prostate cancer  Continue annual PSA screening.   PSA is 1.12 (9/16/2024), stable.    #Microscopic hematuria   Microscopic UA showed 6-10 RBC's. (09/16)  He states he had intercourse prior to urinalysis.   Repeat microanalysis only showed 1-2 RBC's. (09/17)  Provided reassurance, no further hematuria work-up indicated at this time.   RTC in 1 year for reassessment and UA.     All questions were answered to the patient’s satisfaction.  Patient agrees with the plan and wishes to proceed.  Continue follow-up for ongoing care of his chronic medical conditions.    Scribed for Dr. Anushka Vu by Armando Hernandez.  I, Dr. Anushka Vu, have personally reviewed and agreed with the information entered by the Virtual Scribe. 09/20/24

## 2024-11-27 DIAGNOSIS — K31.A11 GASTRIC INTESTINAL METAPLASIA WITHOUT DYSPLASIA, INVOLVING THE ANTRUM: Primary | ICD-10-CM

## 2024-11-27 RX ORDER — HYDROGEN PEROXIDE 3 %
20 SOLUTION, NON-ORAL MISCELLANEOUS
COMMUNITY
End: 2024-11-27 | Stop reason: SDUPTHER

## 2024-12-02 RX ORDER — ESOMEPRAZOLE MAGNESIUM 40 MG/1
40 CAPSULE, DELAYED RELEASE ORAL
Qty: 90 CAPSULE | Refills: 3 | Status: SHIPPED | OUTPATIENT
Start: 2024-12-02 | End: 2025-12-02

## 2025-01-27 DIAGNOSIS — R73.03 PRE-DIABETES: ICD-10-CM

## 2025-01-27 RX ORDER — METFORMIN HYDROCHLORIDE 500 MG/1
1000 TABLET, EXTENDED RELEASE ORAL
Qty: 180 TABLET | Refills: 3 | Status: SHIPPED | OUTPATIENT
Start: 2025-01-27

## 2025-01-30 ENCOUNTER — TELEPHONE (OUTPATIENT)
Dept: PRIMARY CARE | Facility: CLINIC | Age: 60
End: 2025-01-30
Payer: COMMERCIAL

## 2025-01-30 DIAGNOSIS — I10 PRIMARY HYPERTENSION: ICD-10-CM

## 2025-01-30 RX ORDER — LOSARTAN POTASSIUM AND HYDROCHLOROTHIAZIDE 12.5; 5 MG/1; MG/1
1 TABLET ORAL DAILY
Qty: 90 TABLET | Refills: 0 | Status: SHIPPED | OUTPATIENT
Start: 2025-01-30

## 2025-01-30 NOTE — TELEPHONE ENCOUNTER
Calling stating patient called for refills and did not get it, plus is sick and needs dr hines to call in inhaler.    Called patient back, aware, we sent metformin on Monday from voicemail he left. Pt states needs losartan and an inhaler because he is sick and it is going into his chest. Told patient we do ask for a week turn around for refills. States he is out of losartan completely and his inhaler has a few squirts left. Told patient we would ask Jey as a courtesy to fill losartan since Víctor is out sick yesterday and today. Patient recommended to go to urgent care, for possible bronchitis or something needing antibiotic due to discolored mucous. Patient states doesn't need inhaler at this time.

## 2025-04-08 DIAGNOSIS — E78.5 DYSLIPIDEMIA: ICD-10-CM

## 2025-04-09 RX ORDER — ROSUVASTATIN CALCIUM 40 MG/1
40 TABLET, COATED ORAL DAILY
Qty: 90 TABLET | Refills: 3 | Status: SHIPPED | OUTPATIENT
Start: 2025-04-09

## 2025-05-02 DIAGNOSIS — I10 PRIMARY HYPERTENSION: ICD-10-CM

## 2025-05-02 RX ORDER — LOSARTAN POTASSIUM AND HYDROCHLOROTHIAZIDE 12.5; 5 MG/1; MG/1
1 TABLET ORAL DAILY
Qty: 90 TABLET | Refills: 3 | Status: SHIPPED | OUTPATIENT
Start: 2025-05-02

## 2025-09-18 ENCOUNTER — APPOINTMENT (OUTPATIENT)
Dept: PRIMARY CARE | Facility: CLINIC | Age: 60
End: 2025-09-18
Payer: COMMERCIAL

## 2025-09-24 ENCOUNTER — APPOINTMENT (OUTPATIENT)
Dept: UROLOGY | Facility: CLINIC | Age: 60
End: 2025-09-24
Payer: COMMERCIAL

## (undated) DEVICE — DRAPE, SHEET, UTILITY, NON ABSORBENT, 18 X 26 IN, LF

## (undated) DEVICE — DRESSING, GAUZE, FLUFF, 1 PLY, 18 X 36 IN

## (undated) DEVICE — DRAPE, SHEET, ENDOSCOPY, GENERAL, FENESTRATED, ARMBOARD COVER, 98 X 123.5 IN, DISPOSABLE, LF, STERILE

## (undated) DEVICE — PROBE, VTI DOPPLER, STRAIGHT, STERILE

## (undated) DEVICE — DRAPE, SMARTDRAPE, FOR TIVATO MICROSCOPE

## (undated) DEVICE — ELECTRODE, ELECTROSURGICAL, NEEDLE, MODIFIED, BLUNT, NONSTICK, 2.75 IN

## (undated) DEVICE — SUTURE, MONOCRYL, 4-0, 18 IN, PS2, UNDYED

## (undated) DEVICE — SYRINGE, 10 CC, LUER LOCK

## (undated) DEVICE — SUTURE, SILK, 3-0, 18 IN, MULTIPACK, BLACK

## (undated) DEVICE — CATHETER, IV, ANGIOCATH, 24 G X 0.75 IN, FEP POLYMER

## (undated) DEVICE — CORD, CAUTERY, BIOPOLAR FORCEP, 12FT

## (undated) DEVICE — SUTURE, VICRYL, 2-0, 27 IN, UR-6, VIOLET

## (undated) DEVICE — SUTURE, SILK, 2-0, 18 IN, BLACK

## (undated) DEVICE — DRAPE, MICROSCOPE, W/REMOVABLE LENS

## (undated) DEVICE — DRAIN, PENROSE, 1/4 IN X 18 IN, STERILE, LF

## (undated) DEVICE — COVER, XRAY, CASSETTE, UNIVERSAL, 20 X 40 IN

## (undated) DEVICE — SUTURE, VICRYL, 3-0, 27 IN, SH

## (undated) DEVICE — GLOVE, SURGICAL, PROTEXIS MICRO, 7.0, PF, LATEX